# Patient Record
Sex: MALE | Race: WHITE | NOT HISPANIC OR LATINO | Employment: OTHER | URBAN - METROPOLITAN AREA
[De-identification: names, ages, dates, MRNs, and addresses within clinical notes are randomized per-mention and may not be internally consistent; named-entity substitution may affect disease eponyms.]

---

## 2017-01-19 RX ORDER — PREDNISONE 20 MG/1
TABLET ORAL
Qty: 14 TABLET | Refills: 0 | Status: SHIPPED | OUTPATIENT
Start: 2017-01-19 | End: 2017-01-29 | Stop reason: SDUPTHER

## 2017-01-29 RX ORDER — PREDNISONE 20 MG/1
TABLET ORAL
Qty: 14 TABLET | Refills: 0 | Status: SHIPPED | OUTPATIENT
Start: 2017-01-29 | End: 2017-03-09 | Stop reason: SDUPTHER

## 2017-02-16 RX ORDER — PREDNISONE 20 MG/1
TABLET ORAL
Qty: 14 TABLET | Refills: 0 | OUTPATIENT
Start: 2017-02-16

## 2017-03-09 ENCOUNTER — OFFICE VISIT (OUTPATIENT)
Dept: FAMILY MEDICINE CLINIC | Facility: CLINIC | Age: 67
End: 2017-03-09

## 2017-03-09 VITALS
SYSTOLIC BLOOD PRESSURE: 156 MMHG | RESPIRATION RATE: 16 BRPM | WEIGHT: 301 LBS | HEIGHT: 68 IN | HEART RATE: 131 BPM | TEMPERATURE: 98.6 F | BODY MASS INDEX: 45.62 KG/M2 | DIASTOLIC BLOOD PRESSURE: 77 MMHG

## 2017-03-09 DIAGNOSIS — M79.605 PAIN OF LEFT LOWER EXTREMITY: ICD-10-CM

## 2017-03-09 DIAGNOSIS — Z12.5 SCREENING FOR PROSTATE CANCER: ICD-10-CM

## 2017-03-09 DIAGNOSIS — I10 BENIGN ESSENTIAL HYPERTENSION: Primary | ICD-10-CM

## 2017-03-09 DIAGNOSIS — R00.0 TACHYCARDIA: ICD-10-CM

## 2017-03-09 PROCEDURE — 99214 OFFICE O/P EST MOD 30 MIN: CPT | Performed by: FAMILY MEDICINE

## 2017-03-09 RX ORDER — PREDNISONE 20 MG/1
20 TABLET ORAL 2 TIMES DAILY
Qty: 14 TABLET | Refills: 0 | Status: SHIPPED | OUTPATIENT
Start: 2017-03-09 | End: 2018-06-14

## 2017-03-09 NOTE — PROGRESS NOTES
"Subjective   Ganesh Viera is a 66 y.o. male.     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 He has a h/o recurrent leg pain over the years and reports he usually gets some Prednisone that takes care of the issue well.      The following portions of the patient's history were reviewed and updated as appropriate: allergies, current medications, past family history, past medical history, past social history, past surgical history and problem list.    Review of Systems   Constitutional: Negative for activity change, chills, fatigue and fever.   Respiratory: Negative for cough and chest tightness.    Cardiovascular: Negative for chest pain and palpitations.   Gastrointestinal: Negative for abdominal pain and nausea.   Endocrine: Negative for cold intolerance and polydipsia.   Musculoskeletal:        Leg pain   Psychiatric/Behavioral: Negative for behavioral problems and dysphoric mood.   All other systems reviewed and are negative.    Visit Vitals   • /77 (BP Location: Left arm, Patient Position: Sitting, Cuff Size: Large Adult)   • Pulse (!) 131   • Temp 98.6 °F (37 °C) (Oral)   • Resp 16   • Ht 68\" (172.7 cm)   • Wt (!) 301 lb (137 kg)   • BMI 45.77 kg/m2       Objective   Physical Exam   Constitutional: He appears well-developed and well-nourished.   Neck: Neck supple. No thyromegaly present.   Cardiovascular: Regular rhythm.  Tachycardia present.    No murmur heard.  Pulmonary/Chest: Effort normal and breath sounds normal.   Abdominal: Bowel sounds are normal.   Psychiatric: He has a normal mood and affect. His behavior is normal.   Nursing note and vitals reviewed.      Assessment/Plan   Ganesh was seen today for leg pain.    Diagnoses and all orders for this visit:    Benign essential hypertension  -     Comprehensive metabolic panel  -     CBC and Differential  -     TSH    Pain of left lower extremity  -     predniSONE (DELTASONE) 20 MG tablet; Take 1 tablet " by mouth 2 (Two) Times a Day.    Tachycardia  -     T4, Free    Screening for prostate cancer  -     PSA    Other orders  -     Cancel: Lipid panel

## 2017-03-12 DIAGNOSIS — D72.829 LEUKOCYTOSIS, UNSPECIFIED TYPE: Primary | ICD-10-CM

## 2017-03-12 LAB
ALBUMIN SERPL-MCNC: 4.4 G/DL (ref 3.6–4.8)
ALBUMIN/GLOB SERPL: 1.6 {RATIO} (ref 1.1–2.5)
ALP SERPL-CCNC: 79 IU/L (ref 39–117)
ALT SERPL-CCNC: 15 IU/L (ref 0–44)
AST SERPL-CCNC: 19 IU/L (ref 0–40)
BASOPHILS # BLD AUTO: 0 X10E3/UL (ref 0–0.2)
BASOPHILS NFR BLD AUTO: 0 %
BILIRUB SERPL-MCNC: 0.4 MG/DL (ref 0–1.2)
BUN SERPL-MCNC: 27 MG/DL (ref 8–27)
BUN/CREAT SERPL: 25 (ref 10–22)
CALCIUM SERPL-MCNC: 9.1 MG/DL (ref 8.6–10.2)
CHLORIDE SERPL-SCNC: 97 MMOL/L (ref 96–106)
CO2 SERPL-SCNC: 24 MMOL/L (ref 18–29)
CREAT SERPL-MCNC: 1.06 MG/DL (ref 0.76–1.27)
EOSINOPHIL # BLD AUTO: 0.1 X10E3/UL (ref 0–0.4)
EOSINOPHIL NFR BLD AUTO: 0 %
ERYTHROCYTE [DISTWIDTH] IN BLOOD BY AUTOMATED COUNT: 15.5 % (ref 12.3–15.4)
GLOBULIN SER CALC-MCNC: 2.8 G/DL (ref 1.5–4.5)
GLUCOSE SERPL-MCNC: 99 MG/DL (ref 65–99)
HCT VFR BLD AUTO: 44.8 % (ref 37.5–51)
HGB BLD-MCNC: 14.4 G/DL (ref 12.6–17.7)
IMM GRANULOCYTES # BLD: 0 X10E3/UL (ref 0–0.1)
IMM GRANULOCYTES NFR BLD: 0 %
LYMPHOCYTES # BLD AUTO: 3.1 X10E3/UL (ref 0.7–3.1)
LYMPHOCYTES NFR BLD AUTO: 15 %
MCH RBC QN AUTO: 28.3 PG (ref 26.6–33)
MCHC RBC AUTO-ENTMCNC: 32.1 G/DL (ref 31.5–35.7)
MCV RBC AUTO: 88 FL (ref 79–97)
MONOCYTES # BLD AUTO: 1.6 X10E3/UL (ref 0.1–0.9)
MONOCYTES NFR BLD AUTO: 8 %
MORPHOLOGY BLD-IMP: (no result)
NEUTROPHILS # BLD AUTO: 16.3 X10E3/UL (ref 1.4–7)
NEUTROPHILS NFR BLD AUTO: 77 %
PLATELET # BLD AUTO: 512 X10E3/UL (ref 150–379)
POTASSIUM SERPL-SCNC: 4 MMOL/L (ref 3.5–5.2)
PROT SERPL-MCNC: 7.2 G/DL (ref 6–8.5)
PSA SERPL-MCNC: 3.8 NG/ML (ref 0–4)
RBC # BLD AUTO: 5.08 X10E6/UL (ref 4.14–5.8)
SODIUM SERPL-SCNC: 140 MMOL/L (ref 134–144)
T4 FREE SERPL-MCNC: 1.47 NG/DL (ref 0.82–1.77)
TSH SERPL DL<=0.005 MIU/L-ACNC: 2.18 UIU/ML (ref 0.45–4.5)
WBC # BLD AUTO: 21.2 X10E3/UL (ref 3.4–10.8)

## 2017-03-23 ENCOUNTER — APPOINTMENT (OUTPATIENT)
Dept: LAB | Facility: HOSPITAL | Age: 67
End: 2017-03-23

## 2017-03-23 ENCOUNTER — APPOINTMENT (OUTPATIENT)
Dept: ONCOLOGY | Facility: CLINIC | Age: 67
End: 2017-03-23

## 2017-03-23 DIAGNOSIS — M79.605 PAIN OF LEFT LOWER EXTREMITY: ICD-10-CM

## 2017-03-23 RX ORDER — PREDNISONE 20 MG/1
TABLET ORAL
Qty: 14 TABLET | Refills: 0 | OUTPATIENT
Start: 2017-03-23

## 2017-04-10 ENCOUNTER — LAB (OUTPATIENT)
Dept: LAB | Facility: HOSPITAL | Age: 67
End: 2017-04-10

## 2017-04-10 ENCOUNTER — CONSULT (OUTPATIENT)
Dept: ONCOLOGY | Facility: CLINIC | Age: 67
End: 2017-04-10

## 2017-04-10 ENCOUNTER — APPOINTMENT (OUTPATIENT)
Dept: LAB | Facility: HOSPITAL | Age: 67
End: 2017-04-10

## 2017-04-10 VITALS
OXYGEN SATURATION: 95 % | HEIGHT: 66 IN | HEART RATE: 110 BPM | BODY MASS INDEX: 46.93 KG/M2 | TEMPERATURE: 97.7 F | RESPIRATION RATE: 18 BRPM | SYSTOLIC BLOOD PRESSURE: 158 MMHG | DIASTOLIC BLOOD PRESSURE: 100 MMHG | WEIGHT: 292 LBS

## 2017-04-10 DIAGNOSIS — D72.828 OTHER ELEVATED WHITE BLOOD CELL (WBC) COUNT: Primary | ICD-10-CM

## 2017-04-10 DIAGNOSIS — M15.3 POST-TRAUMATIC OSTEOARTHRITIS OF MULTIPLE JOINTS: Primary | ICD-10-CM

## 2017-04-10 PROBLEM — D72.829 LEUKOCYTOSIS: Status: ACTIVE | Noted: 2017-04-10

## 2017-04-10 LAB
ALBUMIN SERPL-MCNC: 4.1 G/DL (ref 3.5–5.2)
ALBUMIN/GLOB SERPL: 1.1 G/DL (ref 1.1–2.4)
ALP SERPL-CCNC: 85 U/L (ref 38–116)
ALT SERPL W P-5'-P-CCNC: 15 U/L (ref 0–41)
ANION GAP SERPL CALCULATED.3IONS-SCNC: 17.5 MMOL/L
AST SERPL-CCNC: 18 U/L (ref 0–40)
BASOPHILS # BLD AUTO: 0.06 10*3/MM3 (ref 0–0.1)
BASOPHILS NFR BLD AUTO: 0.4 % (ref 0–1.1)
BILIRUB SERPL-MCNC: 0.3 MG/DL (ref 0.1–1.2)
BUN BLD-MCNC: 15 MG/DL (ref 6–20)
BUN/CREAT SERPL: 12.2 (ref 7.3–30)
CALCIUM SPEC-SCNC: 9.7 MG/DL (ref 8.5–10.2)
CHLORIDE SERPL-SCNC: 95 MMOL/L (ref 98–107)
CO2 SERPL-SCNC: 27.5 MMOL/L (ref 22–29)
CREAT BLD-MCNC: 1.23 MG/DL (ref 0.7–1.3)
DEPRECATED RDW RBC AUTO: 49 FL (ref 37–49)
EOSINOPHIL # BLD AUTO: 0.1 10*3/MM3 (ref 0–0.36)
EOSINOPHIL NFR BLD AUTO: 0.6 % (ref 1–5)
ERYTHROCYTE [DISTWIDTH] IN BLOOD BY AUTOMATED COUNT: 15.3 % (ref 11.7–14.5)
ERYTHROCYTE [SEDIMENTATION RATE] IN BLOOD: 64 MM/HR (ref 0–20)
FERRITIN SERPL-MCNC: 400.2 NG/ML (ref 30–400)
GFR SERPL CREATININE-BSD FRML MDRD: 59 ML/MIN/1.73
GLOBULIN UR ELPH-MCNC: 3.9 GM/DL (ref 1.8–3.5)
GLUCOSE BLD-MCNC: 144 MG/DL (ref 74–124)
HCT VFR BLD AUTO: 42.4 % (ref 40–49)
HGB BLD-MCNC: 13.7 G/DL (ref 13.5–16.5)
HOLD SPECIMEN: NORMAL
IMM GRANULOCYTES # BLD: 0.1 10*3/MM3 (ref 0–0.03)
IMM GRANULOCYTES NFR BLD: 0.6 % (ref 0–0.5)
IRON 24H UR-MRATE: 45 MCG/DL (ref 59–158)
IRON SATN MFR SERPL: 17 % (ref 14–48)
LDH SERPL-CCNC: 210 U/L (ref 99–259)
LYMPHOCYTES # BLD AUTO: 1.4 10*3/MM3 (ref 1–3.5)
LYMPHOCYTES NFR BLD AUTO: 9 % (ref 20–49)
MCH RBC QN AUTO: 28.4 PG (ref 27–33)
MCHC RBC AUTO-ENTMCNC: 32.3 G/DL (ref 32–35)
MCV RBC AUTO: 88 FL (ref 83–97)
MONOCYTES # BLD AUTO: 1.5 10*3/MM3 (ref 0.25–0.8)
MONOCYTES NFR BLD AUTO: 9.6 % (ref 4–12)
NEUTROPHILS # BLD AUTO: 12.39 10*3/MM3 (ref 1.5–7)
NEUTROPHILS NFR BLD AUTO: 79.8 % (ref 39–75)
NRBC BLD MANUAL-RTO: 0 /100 WBC (ref 0–0)
PLATELET # BLD AUTO: 428 10*3/MM3 (ref 150–375)
PMV BLD AUTO: 10.1 FL (ref 8.9–12.1)
POTASSIUM BLD-SCNC: 3.7 MMOL/L (ref 3.5–4.7)
PROT SERPL-MCNC: 8 G/DL (ref 6.3–8)
RBC # BLD AUTO: 4.82 10*6/MM3 (ref 4.3–5.5)
SODIUM BLD-SCNC: 140 MMOL/L (ref 134–145)
TIBC SERPL-MCNC: 262 MCG/DL (ref 249–505)
TRANSFERRIN SERPL-MCNC: 187 MG/DL (ref 200–360)
WBC NRBC COR # BLD: 15.55 10*3/MM3 (ref 4–10)
WHOLE BLOOD HOLD SPECIMEN: NORMAL

## 2017-04-10 PROCEDURE — 83540 ASSAY OF IRON: CPT | Performed by: INTERNAL MEDICINE

## 2017-04-10 PROCEDURE — 83615 LACTATE (LD) (LDH) ENZYME: CPT | Performed by: INTERNAL MEDICINE

## 2017-04-10 PROCEDURE — 99244 OFF/OP CNSLTJ NEW/EST MOD 40: CPT | Performed by: INTERNAL MEDICINE

## 2017-04-10 PROCEDURE — 80053 COMPREHEN METABOLIC PANEL: CPT | Performed by: INTERNAL MEDICINE

## 2017-04-10 PROCEDURE — 36415 COLL VENOUS BLD VENIPUNCTURE: CPT | Performed by: INTERNAL MEDICINE

## 2017-04-10 PROCEDURE — 85025 COMPLETE CBC W/AUTO DIFF WBC: CPT | Performed by: INTERNAL MEDICINE

## 2017-04-10 PROCEDURE — 84466 ASSAY OF TRANSFERRIN: CPT | Performed by: INTERNAL MEDICINE

## 2017-04-10 PROCEDURE — 85652 RBC SED RATE AUTOMATED: CPT | Performed by: INTERNAL MEDICINE

## 2017-04-10 PROCEDURE — 82728 ASSAY OF FERRITIN: CPT | Performed by: INTERNAL MEDICINE

## 2017-04-10 NOTE — PROGRESS NOTES
Subjective     REASON FOR CONSULTATION:  Leukocytosis  Provide an opinion on any further workup or treatment                             REQUESTING PHYSICIAN:  Dr. Ganesh Gomez    RECORDS OBTAINED:  Records of the patients history including those obtained from the referring provider were reviewed and summarized in detail.    History of Present Illness   Mr. Viera is a very pleasant 66-year-old man seen today at the request of his primary care physician for leukocytosis.  The patient has history of psoriasis and tendinitis.  He has joint complaints primarily affecting the knees and wonders if he potentially has psoriatic arthritis.  The patient states he was a previous patient of Dr. Frias and was told several years ago that his white blood cell count was elevated but unsure the number.  He recently saw Dr. Gomez with complaints of tendinitis and was placed on prednisone.  He states he had a CBC performed 2 or 3 days after starting prednisone which showed a elevated total white blood cell count of 21,000 and elevated platelets of 512.  The differential was pertinent for neutrophilia 16.3 and mild elevation of the monocytes 1.6.  Hemoglobin was normal at 14.4.  He finished his prednisone course approximately 2 weeks ago.  He still complains of pain affecting the right knee.    Otherwise the patient generally feels well.  He has lost approximately 16 pounds over the past one year after some dietary modification after half-way.  He has no significant smoking history.  He denies cough or shortness of breath.  He denies pain other than the joint pain described.  He denies fevers or chills.  He denies night sweats.  His appetite is good.    Past Medical History:   Diagnosis Date   • Benign essential hypertension    • CHF (congestive heart failure)    • Eye exam, routine 2011   • H/O Leukocytosis    • Osteoarthritis    • Peripheral edema    • Psoriasis         No past surgical history on file.     Current  Outpatient Prescriptions on File Prior to Visit   Medication Sig Dispense Refill   • aspirin 81 MG EC tablet Take 81 mg by mouth daily.     • calcipotriene (DOVONEX) 0.005 % cream apply to affected area twice a day 120 g 5   • clobetasol (TEMOVATE) 0.05 % cream apply to affected area once daily 60 g 5   • fluticasone (CUTIVATE) 0.05 % cream apply THIN FILM to affected area once daily 60 g 5   • furosemide (LASIX) 40 MG tablet take 1 tablet by mouth once daily 30 tablet 5   • lisinopril-hydrochlorothiazide (PRINZIDE,ZESTORETIC) 20-12.5 MG per tablet take 2 tablets by mouth once daily 60 tablet 5   • meloxicam (MOBIC) 15 MG tablet take 1 tablet by mouth once daily 30 tablet 5   • Multiple Vitamins-Minerals (MULTIVITAMIN ADULT PO) Take  by mouth.     • nisoldipine (SULAR) 34 MG 24 hr tablet take 1 tablet by mouth once daily 30 tablet 5   • predniSONE (DELTASONE) 20 MG tablet Take 1 tablet by mouth 2 (Two) Times a Day. 14 tablet 0   • Thiamine HCl (VITAMIN B-1) 50 MG tablet Take 50 mg by mouth daily.       No current facility-administered medications on file prior to visit.         ALLERGIES:    Allergies   Allergen Reactions   • Pollen Extract         Social History     Social History   • Marital status:      Spouse name: N/A   • Number of children: N/A   • Years of education: N/A     Social History Main Topics   • Smoking status: Former Smoker   • Smokeless tobacco: Never Used   • Alcohol use Yes      Comment: occ   • Drug use: No   • Sexual activity: Not Asked     Other Topics Concern   • None     Social History Narrative        Family History   Problem Relation Age of Onset   • Heart disease Mother    • Hypertension Mother    • Diabetes Father    • Hypertension Father    • Kidney disease Father         Review of Systems   Constitutional: Negative for activity change, appetite change, fatigue, fever and unexpected weight change.   HENT: Negative.    Eyes: Negative.    Respiratory: Negative.    Cardiovascular:  "Negative.    Gastrointestinal: Negative.    Genitourinary: Negative.    Musculoskeletal: Positive for arthralgias and joint swelling.   Skin: Positive for rash.   Neurological: Negative.    Hematological: Negative.    Psychiatric/Behavioral: Negative.          Objective     Vitals:    04/10/17 0828   BP: 158/100   Pulse: 110   Resp: 18   Temp: 97.7 °F (36.5 °C)   SpO2: 95%   Weight: 292 lb (132 kg)   Height: 66.14\" (168 cm)  Comment: new pt/ht   PainSc: 3  Comment: pain in R knee     Current Status 4/10/2017   ECOG score 0       Physical Exam    General: Pleasant well-developed obese gentleman in no acute distress  HEENT: No scleral icterus noted  Chest: No wheezing, rales, rhonchi  Cardiovascular: Regular rate rhythm, normal S1-S2  Abdomen: Obese, soft, no masses noted; I cannot assess splenomegaly secondary to his habitus  Musculoskeletal: There are venous stasis changes of the lower extremities with trace to 1+ edema of the ankles   Skin: Multiple psoriatic plaques noted    RECENT LABS:  Hematology WBC   Date Value Ref Range Status   04/10/2017 15.55 (H) 4.00 - 10.00 10*3/mm3 Final     RBC   Date Value Ref Range Status   04/10/2017 4.82 4.30 - 5.50 10*6/mm3 Final     Hemoglobin   Date Value Ref Range Status   04/10/2017 13.7 13.5 - 16.5 g/dL Final     Hematocrit   Date Value Ref Range Status   04/10/2017 42.4 40.0 - 49.0 % Final     Platelets   Date Value Ref Range Status   04/10/2017 428 (H) 150 - 375 10*3/mm3 Final        Lab Results   Component Value Date    BUN 27 03/11/2017    CREATININE 1.06 03/11/2017    EGFRIFNONA 74 03/11/2017    EGFRIFAFRI 85 03/11/2017    BCR 25 (H) 03/11/2017    K 4.0 03/11/2017    CO2 24 03/11/2017    CALCIUM 9.1 03/11/2017    PROTENTOTREF 7.2 03/11/2017    ALBUMIN 4.4 03/11/2017    LABIL2 1.6 03/11/2017    AST 19 03/11/2017    ALT 15 03/11/2017         Assessment/Plan     This is a very pleasant 66-year-old man with leukocytosis and thrombocytosis.  His CBC is somewhat difficult to " interpret as he has been on prednisone recently for treatment of tendinitis, but he states he has been off prednisone for approximately 2 weeks.  His differential is pertinent for neutrophilia and monocytosis.  He has history of psoriasis which is active.  Unclear at this time if his elevated counts could be potentially inflammatory secondary to psoriasis.  The white count was lower today than one month ago as he was on prednisone at the previous check.    To further evaluate the patient's leukocytosis and thrombocytosis, I will proceed with a peripheral blood flow cytometry,   J AK analysis, FISH for BCR-ABL, serum protein electrophoresis with immunofixation, LDH, iron profile, sedimentation rate.  I will see the patient back in 2-3 weeks to review results and repeat the CBC.    He has never had a colonoscopy and has familial history of colon cancer in several cousins.  I referred him to GI for screening colonoscopy.

## 2017-04-11 LAB
ALBUMIN SERPL-MCNC: 3.4 G/DL (ref 2.9–4.4)
ALBUMIN/GLOB SERPL: 0.9 {RATIO} (ref 0.7–1.7)
ALPHA1 GLOB FLD ELPH-MCNC: 0.4 G/DL (ref 0–0.4)
ALPHA2 GLOB SERPL ELPH-MCNC: 1.1 G/DL (ref 0.4–1)
B-GLOBULIN SERPL ELPH-MCNC: 1.3 G/DL (ref 0.7–1.3)
GAMMA GLOB SERPL ELPH-MCNC: 0.9 G/DL (ref 0.4–1.8)
GLOBULIN SER CALC-MCNC: 3.7 G/DL (ref 2.2–3.9)
IGA SERPL-MCNC: 318 MG/DL (ref 61–437)
IGG SERPL-MCNC: 872 MG/DL (ref 700–1600)
IGM SERPL-MCNC: 77 MG/DL (ref 20–172)
Lab: ABNORMAL
M-SPIKE: ABNORMAL G/DL
PROT PATTERN SERPL IFE-IMP: NORMAL
PROT SERPL-MCNC: 7.1 G/DL (ref 6–8.5)

## 2017-04-18 LAB
REF LAB TEST METHOD: NORMAL

## 2017-04-24 ENCOUNTER — OFFICE VISIT (OUTPATIENT)
Dept: ONCOLOGY | Facility: CLINIC | Age: 67
End: 2017-04-24

## 2017-04-24 ENCOUNTER — LAB (OUTPATIENT)
Dept: LAB | Facility: HOSPITAL | Age: 67
End: 2017-04-24

## 2017-04-24 VITALS
WEIGHT: 293.7 LBS | RESPIRATION RATE: 18 BRPM | SYSTOLIC BLOOD PRESSURE: 162 MMHG | TEMPERATURE: 97.8 F | HEART RATE: 110 BPM | DIASTOLIC BLOOD PRESSURE: 92 MMHG | HEIGHT: 66 IN | BODY MASS INDEX: 47.2 KG/M2 | OXYGEN SATURATION: 96 %

## 2017-04-24 DIAGNOSIS — D72.828 OTHER ELEVATED WHITE BLOOD CELL (WBC) COUNT: Primary | ICD-10-CM

## 2017-04-24 DIAGNOSIS — D72.828 OTHER ELEVATED WHITE BLOOD CELL (WBC) COUNT: ICD-10-CM

## 2017-04-24 LAB
BASOPHILS # BLD AUTO: 0.05 10*3/MM3 (ref 0–0.1)
BASOPHILS NFR BLD AUTO: 0.4 % (ref 0–1.1)
DEPRECATED RDW RBC AUTO: 47.6 FL (ref 37–49)
EOSINOPHIL # BLD AUTO: 0.18 10*3/MM3 (ref 0–0.36)
EOSINOPHIL NFR BLD AUTO: 1.4 % (ref 1–5)
ERYTHROCYTE [DISTWIDTH] IN BLOOD BY AUTOMATED COUNT: 15.1 % (ref 11.7–14.5)
HCT VFR BLD AUTO: 42.2 % (ref 40–49)
HGB BLD-MCNC: 14 G/DL (ref 13.5–16.5)
IMM GRANULOCYTES # BLD: 0.15 10*3/MM3 (ref 0–0.03)
IMM GRANULOCYTES NFR BLD: 1.1 % (ref 0–0.5)
LYMPHOCYTES # BLD AUTO: 1.67 10*3/MM3 (ref 1–3.5)
LYMPHOCYTES NFR BLD AUTO: 12.6 % (ref 20–49)
MCH RBC QN AUTO: 28.6 PG (ref 27–33)
MCHC RBC AUTO-ENTMCNC: 33.2 G/DL (ref 32–35)
MCV RBC AUTO: 86.3 FL (ref 83–97)
MONOCYTES # BLD AUTO: 1.51 10*3/MM3 (ref 0.25–0.8)
MONOCYTES NFR BLD AUTO: 11.4 % (ref 4–12)
NEUTROPHILS # BLD AUTO: 9.68 10*3/MM3 (ref 1.5–7)
NEUTROPHILS NFR BLD AUTO: 73.1 % (ref 39–75)
NRBC BLD MANUAL-RTO: 0 /100 WBC (ref 0–0)
PLATELET # BLD AUTO: 329 10*3/MM3 (ref 150–375)
PMV BLD AUTO: 10.6 FL (ref 8.9–12.1)
RBC # BLD AUTO: 4.89 10*6/MM3 (ref 4.3–5.5)
WBC NRBC COR # BLD: 13.24 10*3/MM3 (ref 4–10)

## 2017-04-24 PROCEDURE — 36416 COLLJ CAPILLARY BLOOD SPEC: CPT

## 2017-04-24 PROCEDURE — 85025 COMPLETE CBC W/AUTO DIFF WBC: CPT

## 2017-04-24 PROCEDURE — 99213 OFFICE O/P EST LOW 20 MIN: CPT | Performed by: INTERNAL MEDICINE

## 2017-04-24 NOTE — PROGRESS NOTES
Subjective     REASON FOR CONSULTATION:  Leukocytosis  Provide an opinion on any further workup or treatment                             REQUESTING PHYSICIAN:  Dr. Ganesh Gomez    RECORDS OBTAINED:  Records of the patients history including those obtained from the referring provider were reviewed and summarized in detail.    History of Present Illness   Mr. Viera is a very pleasant 66-year-old man seen today at the request of his primary care physician for leukocytosis.  The patient has history of psoriasis and tendinitis.  He has joint complaints primarily affecting the knees and wonders if he potentially has psoriatic arthritis.  The patient states he was a previous patient of Dr. Frias and was told several years ago that his white blood cell count was elevated but unsure the number.  He recently saw Dr. Gomez with complaints of tendinitis and was placed on prednisone.  He states he had a CBC performed 2 or 3 days after starting prednisone which showed a elevated total white blood cell count of 21,000 and elevated platelets of 512.  The differential was pertinent for neutrophilia 16.3 and mild elevation of the monocytes 1.6.  Hemoglobin was normal at 14.4.  He finished his prednisone course approximately 2 weeks ago.  He still complains of pain affecting the right knee.    Otherwise the patient generally feels well.  He has lost approximately 16 pounds over the past one year after some dietary modification after [facility].  He has no significant smoking history.  He denies cough or shortness of breath.  He denies pain other than the joint pain described.  He denies fevers or chills.  He denies night sweats.  His appetite is good.    Past Medical History:   Diagnosis Date   • Benign essential hypertension    • CHF (congestive heart failure)    • Eye exam, routine 2011   • H/O Leukocytosis    • Osteoarthritis    • Peripheral edema    • Psoriasis         No past surgical history on file.     Current  Outpatient Prescriptions on File Prior to Visit   Medication Sig Dispense Refill   • Acetaminophen (TYLENOL ARTHRITIS PAIN PO) Take  by mouth 2 (Two) Times a Day.     • aspirin 81 MG EC tablet Take 81 mg by mouth daily.     • calcipotriene (DOVONEX) 0.005 % cream apply to affected area twice a day 120 g 5   • clobetasol (TEMOVATE) 0.05 % cream apply to affected area once daily 60 g 5   • fluticasone (CUTIVATE) 0.05 % cream apply THIN FILM to affected area once daily 60 g 5   • furosemide (LASIX) 40 MG tablet take 1 tablet by mouth once daily 30 tablet 5   • lisinopril-hydrochlorothiazide (PRINZIDE,ZESTORETIC) 20-12.5 MG per tablet take 2 tablets by mouth once daily 60 tablet 5   • meloxicam (MOBIC) 15 MG tablet take 1 tablet by mouth once daily 30 tablet 5   • Multiple Vitamins-Minerals (MULTIVITAMIN ADULT PO) Take  by mouth.     • nisoldipine (SULAR) 34 MG 24 hr tablet take 1 tablet by mouth once daily 30 tablet 5   • predniSONE (DELTASONE) 20 MG tablet Take 1 tablet by mouth 2 (Two) Times a Day. 14 tablet 0   • Thiamine HCl (VITAMIN B-1) 50 MG tablet Take 50 mg by mouth daily.       No current facility-administered medications on file prior to visit.         ALLERGIES:    Allergies   Allergen Reactions   • Pollen Extract         Social History     Social History   • Marital status:      Spouse name: N/A   • Number of children: N/A   • Years of education: High school     Occupational History   • [employer] Retired     Social History Main Topics   • Smoking status: Former Smoker     Packs/day: 1.00     Years: 10.00     Types: Cigarettes   • Smokeless tobacco: Never Used   • Alcohol use Yes      Comment: occ   • Drug use: No   • Sexual activity: Not Asked     Other Topics Concern   • None     Social History Narrative        Family History   Problem Relation Age of Onset   • Heart disease Mother    • Hypertension Mother    • Heart attack Mother    • Diabetes Father    • Hypertension Father    • Kidney disease Father   "  • Alzheimer's disease Father         Review of Systems   Constitutional: Negative for activity change, appetite change, fatigue, fever and unexpected weight change.   HENT: Negative.    Eyes: Negative.    Respiratory: Negative.    Cardiovascular: Negative.    Gastrointestinal: Negative.    Genitourinary: Negative.    Musculoskeletal: Positive for arthralgias and joint swelling.   Skin: Positive for rash.   Neurological: Negative.    Hematological: Negative.    Psychiatric/Behavioral: Negative.          Objective     Vitals:    04/24/17 1410   BP: 162/92   Pulse: 110   Resp: 18   Temp: 97.8 °F (36.6 °C)   SpO2: 96%   Weight: 293 lb 11.2 oz (133 kg)   Height: 66.14\" (168 cm)   PainSc: 0-No pain     Current Status 4/24/2017   ECOG score 0       Physical Exam    General: Pleasant well-developed obese gentleman in no acute distress  HEENT: No scleral icterus noted  Chest: No wheezing, rales, rhonchi  Cardiovascular: Regular rate rhythm, normal S1-S2  Abdomen: Obese, soft, no masses noted; I cannot assess splenomegaly secondary to his habitus  Musculoskeletal: There are venous stasis changes of the lower extremities with trace to 1+ edema of the ankles   Skin: Multiple psoriatic plaques noted    RECENT LABS:  Hematology WBC   Date Value Ref Range Status   04/24/2017 13.24 (H) 4.00 - 10.00 10*3/mm3 Final     RBC   Date Value Ref Range Status   04/24/2017 4.89 4.30 - 5.50 10*6/mm3 Final     Hemoglobin   Date Value Ref Range Status   04/24/2017 14.0 13.5 - 16.5 g/dL Final     Hematocrit   Date Value Ref Range Status   04/24/2017 42.2 40.0 - 49.0 % Final     Platelets   Date Value Ref Range Status   04/24/2017 329 150 - 375 10*3/mm3 Final        Lab Results   Component Value Date    GLUCOSE 144 (H) 04/10/2017    BUN 15 04/10/2017    CREATININE 1.23 04/10/2017    EGFRIFNONA 59 (L) 04/10/2017    EGFRIFAFRI 85 03/11/2017    BCR 12.2 04/10/2017    K 3.7 04/10/2017    CO2 27.5 04/10/2017    CALCIUM 9.7 04/10/2017    PROTENTOTREF " 7.1 04/10/2017    ALBUMIN 3.4 04/10/2017    ALBUMIN 4.10 04/10/2017    LABIL2 0.9 04/10/2017    LABIL2 1.1 04/10/2017    AST 18 04/10/2017    ALT 15 04/10/2017         Assessment/Plan     This is a very pleasant 66-year-old man with leukocytosis and thrombocytosis.      His laboratory evaluation review today included a peripheral blood flow cytometry which showed no abnormal lymphoid populations and no significant leftward shift of myeloid cells.  Neutrophilia was noted.  Peripheral blood FISH for BCR-ABL was negative.  J AK [id number] and exon 12 mutations were negative.  The ferritin was mildly elevated at 400 with a normal iron saturation of 17%.  Protein electrophoresis showed no M spike.  LDH normal at 210.  The sedimentation rate was elevated at 64.    Given this evaluation, the patient's leukocytosis and previous thrombocytosis (platelets now normal) appears to be reactive and or inflammatory given the elevated sedimentation rate and ferritin which is also an inflammatory molecule.  My suspicion for a hematologic disorder is low.  I will defer back to his PCP for further workup of chronic inflammatory states.  I did recommend the patient have a colonoscopy for cancer screening in addition to a PSA and prostate exam with his PCP.  He has a remote smoking history so one could consider imaging of the chest although he is currently having no shortness of breath, cough or other pulmonary symptoms.

## 2017-05-10 DIAGNOSIS — M15.3 POST-TRAUMATIC OSTEOARTHRITIS OF MULTIPLE JOINTS: ICD-10-CM

## 2017-05-10 DIAGNOSIS — I10 BENIGN ESSENTIAL HYPERTENSION: ICD-10-CM

## 2017-05-10 DIAGNOSIS — R60.9 PERIPHERAL EDEMA: ICD-10-CM

## 2017-05-11 ENCOUNTER — OFFICE VISIT (OUTPATIENT)
Dept: FAMILY MEDICINE CLINIC | Facility: CLINIC | Age: 67
End: 2017-05-11

## 2017-05-11 VITALS
HEIGHT: 68 IN | RESPIRATION RATE: 20 BRPM | HEART RATE: 106 BPM | TEMPERATURE: 98.3 F | DIASTOLIC BLOOD PRESSURE: 98 MMHG | WEIGHT: 290 LBS | BODY MASS INDEX: 43.95 KG/M2 | SYSTOLIC BLOOD PRESSURE: 149 MMHG

## 2017-05-11 DIAGNOSIS — I10 BENIGN ESSENTIAL HYPERTENSION: ICD-10-CM

## 2017-05-11 DIAGNOSIS — G89.29 CHRONIC PAIN OF RIGHT KNEE: Primary | ICD-10-CM

## 2017-05-11 DIAGNOSIS — M25.561 CHRONIC PAIN OF RIGHT KNEE: Primary | ICD-10-CM

## 2017-05-11 DIAGNOSIS — R60.9 PERIPHERAL EDEMA: ICD-10-CM

## 2017-05-11 DIAGNOSIS — L40.9 PSORIASIS: ICD-10-CM

## 2017-05-11 DIAGNOSIS — M15.3 POST-TRAUMATIC OSTEOARTHRITIS OF MULTIPLE JOINTS: ICD-10-CM

## 2017-05-11 PROCEDURE — 99214 OFFICE O/P EST MOD 30 MIN: CPT | Performed by: FAMILY MEDICINE

## 2017-05-11 RX ORDER — FUROSEMIDE 40 MG/1
TABLET ORAL
Qty: 30 TABLET | Refills: 5 | OUTPATIENT
Start: 2017-05-11

## 2017-05-11 RX ORDER — CALCIPOTRIENE 50 UG/G
CREAM TOPICAL 2 TIMES DAILY
Qty: 120 G | Refills: 5 | Status: SHIPPED | OUTPATIENT
Start: 2017-05-11 | End: 2017-05-12 | Stop reason: SDUPTHER

## 2017-05-11 RX ORDER — MELOXICAM 15 MG/1
15 TABLET ORAL DAILY
Qty: 30 TABLET | Refills: 5 | Status: SHIPPED | OUTPATIENT
Start: 2017-05-11 | End: 2017-11-20 | Stop reason: SDUPTHER

## 2017-05-11 RX ORDER — NISOLDIPINE 34 MG/1
TABLET, FILM COATED, EXTENDED RELEASE ORAL
Qty: 30 TABLET | Refills: 5 | OUTPATIENT
Start: 2017-05-11

## 2017-05-11 RX ORDER — FUROSEMIDE 40 MG/1
40 TABLET ORAL DAILY
Qty: 30 TABLET | Refills: 5 | Status: SHIPPED | OUTPATIENT
Start: 2017-05-11 | End: 2017-11-14 | Stop reason: SDUPTHER

## 2017-05-11 RX ORDER — LISINOPRIL AND HYDROCHLOROTHIAZIDE 20; 12.5 MG/1; MG/1
2 TABLET ORAL DAILY
Qty: 60 TABLET | Refills: 5 | Status: SHIPPED | OUTPATIENT
Start: 2017-05-11 | End: 2017-11-14 | Stop reason: SDUPTHER

## 2017-05-11 RX ORDER — LISINOPRIL AND HYDROCHLOROTHIAZIDE 20; 12.5 MG/1; MG/1
TABLET ORAL
Qty: 60 TABLET | Refills: 5 | OUTPATIENT
Start: 2017-05-11

## 2017-05-11 RX ORDER — NISOLDIPINE 34 MG/1
34 TABLET, FILM COATED, EXTENDED RELEASE ORAL DAILY
Qty: 30 TABLET | Refills: 5 | Status: SHIPPED | OUTPATIENT
Start: 2017-05-11 | End: 2017-11-14 | Stop reason: SDUPTHER

## 2017-05-11 RX ORDER — MELOXICAM 15 MG/1
TABLET ORAL
Qty: 30 TABLET | Refills: 5 | OUTPATIENT
Start: 2017-05-11

## 2017-05-11 RX ORDER — CLOBETASOL PROPIONATE 0.5 MG/G
CREAM TOPICAL DAILY
Qty: 60 G | Refills: 5 | Status: SHIPPED | OUTPATIENT
Start: 2017-05-11 | End: 2017-05-12 | Stop reason: SDUPTHER

## 2017-05-11 RX ORDER — FLUTICASONE PROPIONATE 0.05 %
CREAM (GRAM) TOPICAL DAILY
Qty: 60 G | Refills: 5 | Status: SHIPPED | OUTPATIENT
Start: 2017-05-11 | End: 2018-06-14 | Stop reason: SDUPTHER

## 2017-05-12 DIAGNOSIS — M25.561 CHRONIC PAIN OF RIGHT KNEE: ICD-10-CM

## 2017-05-12 DIAGNOSIS — L40.9 PSORIASIS: ICD-10-CM

## 2017-05-12 DIAGNOSIS — G89.29 CHRONIC PAIN OF RIGHT KNEE: ICD-10-CM

## 2017-05-12 RX ORDER — CALCIPOTRIENE 50 UG/G
CREAM TOPICAL 2 TIMES DAILY
Qty: 120 G | Refills: 5 | Status: SHIPPED | OUTPATIENT
Start: 2017-05-12 | End: 2018-06-14 | Stop reason: SDUPTHER

## 2017-05-12 RX ORDER — CLOBETASOL PROPIONATE 0.5 MG/G
CREAM TOPICAL DAILY
Qty: 60 G | Refills: 5 | Status: SHIPPED | OUTPATIENT
Start: 2017-05-12 | End: 2018-06-14 | Stop reason: SDUPTHER

## 2017-11-08 DIAGNOSIS — I10 BENIGN ESSENTIAL HYPERTENSION: ICD-10-CM

## 2017-11-08 DIAGNOSIS — R60.9 PERIPHERAL EDEMA: ICD-10-CM

## 2017-11-08 DIAGNOSIS — M15.3 POST-TRAUMATIC OSTEOARTHRITIS OF MULTIPLE JOINTS: ICD-10-CM

## 2017-11-08 RX ORDER — MELOXICAM 15 MG/1
TABLET ORAL
Qty: 30 TABLET | Refills: 5 | OUTPATIENT
Start: 2017-11-08

## 2017-11-08 RX ORDER — FUROSEMIDE 40 MG/1
TABLET ORAL
Qty: 30 TABLET | Refills: 5 | OUTPATIENT
Start: 2017-11-08

## 2017-11-08 RX ORDER — LISINOPRIL AND HYDROCHLOROTHIAZIDE 20; 12.5 MG/1; MG/1
TABLET ORAL
Qty: 60 TABLET | Refills: 5 | OUTPATIENT
Start: 2017-11-08

## 2017-11-08 RX ORDER — NISOLDIPINE 34 MG/1
TABLET, FILM COATED, EXTENDED RELEASE ORAL
Qty: 30 TABLET | Refills: 5 | OUTPATIENT
Start: 2017-11-08

## 2017-11-14 ENCOUNTER — TELEPHONE (OUTPATIENT)
Dept: FAMILY MEDICINE CLINIC | Facility: CLINIC | Age: 67
End: 2017-11-14

## 2017-11-14 DIAGNOSIS — R60.9 PERIPHERAL EDEMA: ICD-10-CM

## 2017-11-14 DIAGNOSIS — I10 BENIGN ESSENTIAL HYPERTENSION: ICD-10-CM

## 2017-11-14 RX ORDER — NISOLDIPINE 34 MG/1
34 TABLET, FILM COATED, EXTENDED RELEASE ORAL DAILY
Qty: 30 TABLET | Refills: 0 | Status: SHIPPED | OUTPATIENT
Start: 2017-11-14 | End: 2017-11-20 | Stop reason: SDUPTHER

## 2017-11-14 RX ORDER — FUROSEMIDE 40 MG/1
40 TABLET ORAL DAILY
Qty: 30 TABLET | Refills: 0 | Status: SHIPPED | OUTPATIENT
Start: 2017-11-14 | End: 2017-11-20 | Stop reason: SDUPTHER

## 2017-11-14 RX ORDER — LISINOPRIL AND HYDROCHLOROTHIAZIDE 20; 12.5 MG/1; MG/1
2 TABLET ORAL DAILY
Qty: 60 TABLET | Refills: 0 | Status: SHIPPED | OUTPATIENT
Start: 2017-11-14 | End: 2017-11-20 | Stop reason: SDUPTHER

## 2017-11-20 ENCOUNTER — OFFICE VISIT (OUTPATIENT)
Dept: FAMILY MEDICINE CLINIC | Facility: CLINIC | Age: 67
End: 2017-11-20

## 2017-11-20 VITALS
TEMPERATURE: 98.5 F | BODY MASS INDEX: 47.74 KG/M2 | SYSTOLIC BLOOD PRESSURE: 128 MMHG | RESPIRATION RATE: 20 BRPM | WEIGHT: 315 LBS | HEART RATE: 106 BPM | DIASTOLIC BLOOD PRESSURE: 84 MMHG | HEIGHT: 68 IN

## 2017-11-20 DIAGNOSIS — R60.9 PERIPHERAL EDEMA: ICD-10-CM

## 2017-11-20 DIAGNOSIS — M15.3 POST-TRAUMATIC OSTEOARTHRITIS OF MULTIPLE JOINTS: ICD-10-CM

## 2017-11-20 DIAGNOSIS — Z23 IMMUNIZATION DUE: ICD-10-CM

## 2017-11-20 DIAGNOSIS — M25.561 CHRONIC PAIN OF RIGHT KNEE: ICD-10-CM

## 2017-11-20 DIAGNOSIS — I10 BENIGN ESSENTIAL HYPERTENSION: Primary | ICD-10-CM

## 2017-11-20 DIAGNOSIS — G89.29 CHRONIC PAIN OF RIGHT KNEE: ICD-10-CM

## 2017-11-20 LAB
ALBUMIN SERPL-MCNC: 4.9 G/DL (ref 3.5–5.2)
ALBUMIN/GLOB SERPL: 1.5 G/DL
ALP SERPL-CCNC: 116 U/L (ref 39–117)
ALT SERPL-CCNC: 13 U/L (ref 1–41)
AST SERPL-CCNC: 17 U/L (ref 1–40)
BASOPHILS # BLD AUTO: 0.03 10*3/MM3 (ref 0–0.2)
BASOPHILS NFR BLD AUTO: 0.2 % (ref 0–1.5)
BILIRUB SERPL-MCNC: 0.4 MG/DL (ref 0.1–1.2)
BUN SERPL-MCNC: 24 MG/DL (ref 8–23)
BUN/CREAT SERPL: 17.5 (ref 7–25)
CALCIUM SERPL-MCNC: 9.6 MG/DL (ref 8.6–10.5)
CHLORIDE SERPL-SCNC: 98 MMOL/L (ref 98–107)
CHOLEST SERPL-MCNC: 216 MG/DL (ref 0–200)
CO2 SERPL-SCNC: 27.1 MMOL/L (ref 22–29)
CREAT SERPL-MCNC: 1.37 MG/DL (ref 0.76–1.27)
EOSINOPHIL # BLD AUTO: 0.21 10*3/MM3 (ref 0–0.7)
EOSINOPHIL NFR BLD AUTO: 1.6 % (ref 0.3–6.2)
ERYTHROCYTE [DISTWIDTH] IN BLOOD BY AUTOMATED COUNT: 15.5 % (ref 11.5–14.5)
GFR SERPLBLD CREATININE-BSD FMLA CKD-EPI: 52 ML/MIN/1.73
GFR SERPLBLD CREATININE-BSD FMLA CKD-EPI: 63 ML/MIN/1.73
GLOBULIN SER CALC-MCNC: 3.2 GM/DL
GLUCOSE SERPL-MCNC: 120 MG/DL (ref 65–99)
HCT VFR BLD AUTO: 44.9 % (ref 40.4–52.2)
HDLC SERPL-MCNC: 40 MG/DL (ref 40–60)
HGB BLD-MCNC: 14.6 G/DL (ref 13.7–17.6)
IMM GRANULOCYTES # BLD: 0.06 10*3/MM3 (ref 0–0.03)
IMM GRANULOCYTES NFR BLD: 0.4 % (ref 0–0.5)
LDLC SERPL CALC-MCNC: 138 MG/DL (ref 0–100)
LYMPHOCYTES # BLD AUTO: 1.65 10*3/MM3 (ref 0.9–4.8)
LYMPHOCYTES NFR BLD AUTO: 12.4 % (ref 19.6–45.3)
MCH RBC QN AUTO: 28.9 PG (ref 27–32.7)
MCHC RBC AUTO-ENTMCNC: 32.5 G/DL (ref 32.6–36.4)
MCV RBC AUTO: 88.7 FL (ref 79.8–96.2)
MONOCYTES # BLD AUTO: 1.41 10*3/MM3 (ref 0.2–1.2)
MONOCYTES NFR BLD AUTO: 10.6 % (ref 5–12)
NEUTROPHILS # BLD AUTO: 9.99 10*3/MM3 (ref 1.9–8.1)
NEUTROPHILS NFR BLD AUTO: 74.8 % (ref 42.7–76)
PLATELET # BLD AUTO: 288 10*3/MM3 (ref 140–500)
POTASSIUM SERPL-SCNC: 4.2 MMOL/L (ref 3.5–5.2)
PROT SERPL-MCNC: 8.1 G/DL (ref 6–8.5)
RBC # BLD AUTO: 5.06 10*6/MM3 (ref 4.6–6)
SODIUM SERPL-SCNC: 142 MMOL/L (ref 136–145)
TRIGL SERPL-MCNC: 190 MG/DL (ref 0–150)
TSH SERPL DL<=0.005 MIU/L-ACNC: 4.18 MIU/ML (ref 0.27–4.2)
VLDLC SERPL CALC-MCNC: 38 MG/DL (ref 5–40)
WBC # BLD AUTO: 13.35 10*3/MM3 (ref 4.5–10.7)

## 2017-11-20 PROCEDURE — G0008 ADMIN INFLUENZA VIRUS VAC: HCPCS | Performed by: FAMILY MEDICINE

## 2017-11-20 PROCEDURE — 99214 OFFICE O/P EST MOD 30 MIN: CPT | Performed by: FAMILY MEDICINE

## 2017-11-20 PROCEDURE — 90670 PCV13 VACCINE IM: CPT | Performed by: FAMILY MEDICINE

## 2017-11-20 PROCEDURE — 90686 IIV4 VACC NO PRSV 0.5 ML IM: CPT | Performed by: FAMILY MEDICINE

## 2017-11-20 PROCEDURE — G0009 ADMIN PNEUMOCOCCAL VACCINE: HCPCS | Performed by: FAMILY MEDICINE

## 2017-11-20 RX ORDER — LISINOPRIL AND HYDROCHLOROTHIAZIDE 20; 12.5 MG/1; MG/1
2 TABLET ORAL DAILY
Qty: 60 TABLET | Refills: 5 | Status: SHIPPED | OUTPATIENT
Start: 2017-11-20 | End: 2018-06-14 | Stop reason: SDUPTHER

## 2017-11-20 RX ORDER — NISOLDIPINE 34 MG/1
34 TABLET, FILM COATED, EXTENDED RELEASE ORAL DAILY
Qty: 30 TABLET | Refills: 5 | Status: SHIPPED | OUTPATIENT
Start: 2017-11-20 | End: 2018-06-14 | Stop reason: SDUPTHER

## 2017-11-20 RX ORDER — MELOXICAM 15 MG/1
15 TABLET ORAL DAILY
Qty: 30 TABLET | Refills: 5 | Status: SHIPPED | OUTPATIENT
Start: 2017-11-20 | End: 2018-06-14

## 2017-11-20 RX ORDER — FUROSEMIDE 40 MG/1
40 TABLET ORAL DAILY
Qty: 30 TABLET | Refills: 5 | Status: SHIPPED | OUTPATIENT
Start: 2017-11-20 | End: 2018-06-14 | Stop reason: SDUPTHER

## 2017-11-20 NOTE — PROGRESS NOTES
Subjective   Ganesh Viera is a 67 y.o. male.     History of Present Illness     Chief Complaint:   Chief Complaint   Patient presents with   • Hypertension     MED REFILL    • Arthritis       Ganesh Viera 67 y.o. male who presents today for Medical Management of the below listed issues and medication refills.  he has a problem list of   Patient Active Problem List   Diagnosis   • Benign essential hypertension   • History of CHF (congestive heart failure)   • Post-traumatic osteoarthritis of multiple joints   • Peripheral edema   • Psoriasis   • Leukocytosis   .  Since the last visit, he has overall felt well.  he has been compliant with   Current Outpatient Prescriptions:   •  diclofenac (VOLTAREN) 1 % gel gel, Apply 4 g topically 4 (Four) Times a Day., Disp: 5 tube, Rfl: 5  •  furosemide (LASIX) 40 MG tablet, Take 1 tablet by mouth Daily., Disp: 30 tablet, Rfl: 5  •  lisinopril-hydrochlorothiazide (PRINZIDE,ZESTORETIC) 20-12.5 MG per tablet, Take 2 tablets by mouth Daily., Disp: 60 tablet, Rfl: 5  •  meloxicam (MOBIC) 15 MG tablet, Take 1 tablet by mouth Daily., Disp: 30 tablet, Rfl: 5  •  nisoldipine (SULAR) 34 MG 24 hr tablet, Take 1 tablet by mouth Daily., Disp: 30 tablet, Rfl: 5  •  Acetaminophen (TYLENOL ARTHRITIS PAIN PO), Take  by mouth 2 (Two) Times a Day., Disp: , Rfl:   •  aspirin 81 MG EC tablet, Take 81 mg by mouth daily., Disp: , Rfl:   •  calcipotriene (DOVONEX) 0.005 % cream, Apply  topically 2 (Two) Times a Day., Disp: 120 g, Rfl: 5  •  clobetasol (TEMOVATE) 0.05 % cream, Apply  topically Daily., Disp: 60 g, Rfl: 5  •  fluticasone (CUTIVATE) 0.05 % cream, Apply  topically Daily., Disp: 60 g, Rfl: 5  •  Multiple Vitamins-Minerals (MULTIVITAMIN ADULT PO), Take  by mouth., Disp: , Rfl:   •  predniSONE (DELTASONE) 20 MG tablet, Take 1 tablet by mouth 2 (Two) Times a Day., Disp: 14 tablet, Rfl: 0  •  Thiamine HCl (VITAMIN B-1) 50 MG tablet, Take 50 mg by mouth daily., Disp: , Rfl: .  he denies  "medication side effects.    He has seen [facility] for elevated WBC and thought to be reactive.    All of the chronic condition(s) listed above are stable w/o issues.    /84  Pulse 106  Temp 98.5 °F (36.9 °C) (Oral)   Resp 20  Ht 68\" (172.7 cm)  Wt (!) 315 lb (143 kg)  BMI 47.9 kg/m2    Results for orders placed or performed in visit on 04/24/17   CBC Auto Differential   Result Value Ref Range    WBC 13.24 (H) 4.00 - 10.00 10*3/mm3    RBC 4.89 4.30 - 5.50 10*6/mm3    Hemoglobin 14.0 13.5 - 16.5 g/dL    Hematocrit 42.2 40.0 - 49.0 %    MCV 86.3 83.0 - 97.0 fL    MCH 28.6 27.0 - 33.0 pg    MCHC 33.2 32.0 - 35.0 g/dL    RDW 15.1 (H) 11.7 - 14.5 %    RDW-SD 47.6 37.0 - 49.0 fl    MPV 10.6 8.9 - 12.1 fL    Platelets 329 150 - 375 10*3/mm3    Neutrophil % 73.1 39.0 - 75.0 %    Lymphocyte % 12.6 (L) 20.0 - 49.0 %    Monocyte % 11.4 4.0 - 12.0 %    Eosinophil % 1.4 1.0 - 5.0 %    Basophil % 0.4 0.0 - 1.1 %    Immature Grans % 1.1 (H) 0.0 - 0.5 %    Neutrophils, Absolute 9.68 (H) 1.50 - 7.00 10*3/mm3    Lymphocytes, Absolute 1.67 1.00 - 3.50 10*3/mm3    Monocytes, Absolute 1.51 (H) 0.25 - 0.80 10*3/mm3    Eosinophils, Absolute 0.18 0.00 - 0.36 10*3/mm3    Basophils, Absolute 0.05 0.00 - 0.10 10*3/mm3    Immature Grans, Absolute 0.15 (H) 0.00 - 0.03 10*3/mm3    nRBC 0.0 0.0 - 0.0 /100 WBC           The following portions of the patient's history were reviewed and updated as appropriate: allergies, current medications, past family history, past medical history, past social history, past surgical history and problem list.    Review of Systems   Constitutional: Negative for activity change, chills, fatigue and fever.   Respiratory: Negative for cough and shortness of breath.    Cardiovascular: Negative for chest pain and palpitations.   Gastrointestinal: Negative for abdominal pain.   Endocrine: Negative for cold intolerance.   Psychiatric/Behavioral: Negative for behavioral problems and dysphoric mood. The patient is " not nervous/anxious.        Objective   Physical Exam   Constitutional: He appears well-developed and well-nourished.   Neck: Neck supple. No thyromegaly present.   Cardiovascular: Normal rate and regular rhythm.    No murmur heard.  Pulmonary/Chest: Effort normal and breath sounds normal.   Abdominal: Bowel sounds are normal.   Psychiatric: He has a normal mood and affect. His behavior is normal.   Nursing note and vitals reviewed.      Assessment/Plan   Ganesh was seen today for hypertension and arthritis.    Diagnoses and all orders for this visit:    Benign essential hypertension  -     lisinopril-hydrochlorothiazide (PRINZIDE,ZESTORETIC) 20-12.5 MG per tablet; Take 2 tablets by mouth Daily.  -     nisoldipine (SULAR) 34 MG 24 hr tablet; Take 1 tablet by mouth Daily.  -     Comprehensive metabolic panel  -     Lipid panel  -     CBC and Differential  -     TSH    Chronic pain of right knee  -     diclofenac (VOLTAREN) 1 % gel gel; Apply 4 g topically 4 (Four) Times a Day.    Post-traumatic osteoarthritis of multiple joints  -     meloxicam (MOBIC) 15 MG tablet; Take 1 tablet by mouth Daily.    Peripheral edema  -     furosemide (LASIX) 40 MG tablet; Take 1 tablet by mouth Daily.    Immunization due  -     Flu Vaccine Quad PF 3YR+  -     Pneumococcal Conjugate Vaccine 13-Valent All

## 2017-12-27 DIAGNOSIS — N28.9 RENAL INSUFFICIENCY: Primary | ICD-10-CM

## 2017-12-28 LAB
BUN SERPL-MCNC: 18 MG/DL (ref 8–23)
BUN/CREAT SERPL: 14.6 (ref 7–25)
CALCIUM SERPL-MCNC: 9 MG/DL (ref 8.6–10.5)
CHLORIDE SERPL-SCNC: 97 MMOL/L (ref 98–107)
CO2 SERPL-SCNC: 28.1 MMOL/L (ref 22–29)
CREAT SERPL-MCNC: 1.23 MG/DL (ref 0.76–1.27)
GLUCOSE SERPL-MCNC: 91 MG/DL (ref 65–99)
POTASSIUM SERPL-SCNC: 4.3 MMOL/L (ref 3.5–5.2)
SODIUM SERPL-SCNC: 141 MMOL/L (ref 136–145)

## 2018-04-12 DIAGNOSIS — G89.29 CHRONIC PAIN OF RIGHT KNEE: ICD-10-CM

## 2018-04-12 DIAGNOSIS — M25.561 CHRONIC PAIN OF RIGHT KNEE: ICD-10-CM

## 2018-05-08 DIAGNOSIS — G89.29 CHRONIC PAIN OF RIGHT KNEE: ICD-10-CM

## 2018-05-08 DIAGNOSIS — M25.561 CHRONIC PAIN OF RIGHT KNEE: ICD-10-CM

## 2018-06-03 DIAGNOSIS — I10 BENIGN ESSENTIAL HYPERTENSION: ICD-10-CM

## 2018-06-03 DIAGNOSIS — R60.9 PERIPHERAL EDEMA: ICD-10-CM

## 2018-06-04 RX ORDER — FUROSEMIDE 40 MG/1
TABLET ORAL
Qty: 30 TABLET | Refills: 5 | OUTPATIENT
Start: 2018-06-04

## 2018-06-04 RX ORDER — LISINOPRIL AND HYDROCHLOROTHIAZIDE 20; 12.5 MG/1; MG/1
TABLET ORAL
Qty: 60 TABLET | Refills: 5 | OUTPATIENT
Start: 2018-06-04

## 2018-06-04 RX ORDER — NISOLDIPINE 34 MG/1
TABLET, FILM COATED, EXTENDED RELEASE ORAL
Qty: 30 TABLET | Refills: 5 | OUTPATIENT
Start: 2018-06-04

## 2018-06-05 DIAGNOSIS — G89.29 CHRONIC PAIN OF RIGHT KNEE: ICD-10-CM

## 2018-06-05 DIAGNOSIS — M25.561 CHRONIC PAIN OF RIGHT KNEE: ICD-10-CM

## 2018-06-14 ENCOUNTER — OFFICE VISIT (OUTPATIENT)
Dept: FAMILY MEDICINE CLINIC | Facility: CLINIC | Age: 68
End: 2018-06-14

## 2018-06-14 VITALS
DIASTOLIC BLOOD PRESSURE: 84 MMHG | SYSTOLIC BLOOD PRESSURE: 138 MMHG | WEIGHT: 299 LBS | HEIGHT: 68 IN | RESPIRATION RATE: 16 BRPM | OXYGEN SATURATION: 98 % | BODY MASS INDEX: 45.31 KG/M2 | TEMPERATURE: 98.4 F | HEART RATE: 86 BPM

## 2018-06-14 DIAGNOSIS — M25.561 CHRONIC PAIN OF RIGHT KNEE: ICD-10-CM

## 2018-06-14 DIAGNOSIS — I10 BENIGN ESSENTIAL HYPERTENSION: Primary | ICD-10-CM

## 2018-06-14 DIAGNOSIS — G89.29 CHRONIC PAIN OF RIGHT KNEE: ICD-10-CM

## 2018-06-14 DIAGNOSIS — I48.91 ATRIAL FIBRILLATION, NEW ONSET (HCC): ICD-10-CM

## 2018-06-14 DIAGNOSIS — R60.9 PERIPHERAL EDEMA: ICD-10-CM

## 2018-06-14 DIAGNOSIS — Z00.00 WELCOME TO MEDICARE PREVENTIVE VISIT: ICD-10-CM

## 2018-06-14 DIAGNOSIS — L40.9 PSORIASIS: ICD-10-CM

## 2018-06-14 PROCEDURE — 93000 ELECTROCARDIOGRAM COMPLETE: CPT | Performed by: FAMILY MEDICINE

## 2018-06-14 PROCEDURE — 99214 OFFICE O/P EST MOD 30 MIN: CPT | Performed by: FAMILY MEDICINE

## 2018-06-14 RX ORDER — LISINOPRIL AND HYDROCHLOROTHIAZIDE 20; 12.5 MG/1; MG/1
2 TABLET ORAL DAILY
Qty: 60 TABLET | Refills: 5 | Status: SHIPPED | OUTPATIENT
Start: 2018-06-14 | End: 2018-12-10 | Stop reason: SDUPTHER

## 2018-06-14 RX ORDER — FLUTICASONE PROPIONATE 0.05 %
CREAM (GRAM) TOPICAL DAILY
Qty: 60 G | Refills: 5 | Status: SHIPPED | OUTPATIENT
Start: 2018-06-14 | End: 2019-07-11 | Stop reason: SDUPTHER

## 2018-06-14 RX ORDER — CLOBETASOL PROPIONATE 0.5 MG/G
CREAM TOPICAL DAILY
Qty: 60 G | Refills: 5 | Status: SHIPPED | OUTPATIENT
Start: 2018-06-14 | End: 2019-07-03 | Stop reason: SDUPTHER

## 2018-06-14 RX ORDER — MELOXICAM 15 MG/1
15 TABLET ORAL DAILY
Qty: 30 TABLET | Refills: 5 | Status: CANCELLED | OUTPATIENT
Start: 2018-06-14

## 2018-06-14 RX ORDER — CALCIPOTRIENE 50 UG/G
CREAM TOPICAL
Qty: 120 G | Refills: 5 | Status: SHIPPED | OUTPATIENT
Start: 2018-06-14 | End: 2019-07-11 | Stop reason: SDUPTHER

## 2018-06-14 RX ORDER — NISOLDIPINE 34 MG/1
34 TABLET, FILM COATED, EXTENDED RELEASE ORAL DAILY
Qty: 30 TABLET | Refills: 5 | Status: SHIPPED | OUTPATIENT
Start: 2018-06-14 | End: 2018-12-10 | Stop reason: SDUPTHER

## 2018-06-14 RX ORDER — FUROSEMIDE 40 MG/1
40 TABLET ORAL DAILY
Qty: 30 TABLET | Refills: 5 | Status: SHIPPED | OUTPATIENT
Start: 2018-06-14 | End: 2018-12-10 | Stop reason: SDUPTHER

## 2018-06-14 NOTE — PROGRESS NOTES
Subjective   Ganesh Viera is a 67 y.o. male.     History of Present Illness     Chief Complaint:   Chief Complaint   Patient presents with   • Hypertension   • Psoriasis   • Knee Pain       Ganesh Viera 67 y.o. male who presents today for Medical Management of the below listed issues and medication refills.  he has a problem list of   Patient Active Problem List   Diagnosis   • Benign essential hypertension   • History of CHF (congestive heart failure)   • Post-traumatic osteoarthritis of multiple joints   • Peripheral edema   • Psoriasis   • Leukocytosis   • Atrial fibrillation, new onset   .  Since the last visit, he has overall felt well.  he has been compliant with   Current Outpatient Prescriptions:   •  calcipotriene (DOVONEX) 0.005 % cream, Apply  topically Daily., Disp: 120 g, Rfl: 5  •  clobetasol (TEMOVATE) 0.05 % cream, Apply  topically Daily., Disp: 60 g, Rfl: 5  •  fluticasone (CUTIVATE) 0.05 % cream, Apply  topically Daily., Disp: 60 g, Rfl: 5  •  VOLTAREN 1 % gel gel, apply 4 grams to affected area four times a day, Disp: 500 g, Rfl: 0  •  Acetaminophen (TYLENOL ARTHRITIS PAIN PO), Take  by mouth 2 (Two) Times a Day., Disp: , Rfl:   •  furosemide (LASIX) 40 MG tablet, Take 1 tablet by mouth Daily., Disp: 30 tablet, Rfl: 5  •  lisinopril-hydrochlorothiazide (PRINZIDE,ZESTORETIC) 20-12.5 MG per tablet, Take 2 tablets by mouth Daily., Disp: 60 tablet, Rfl: 5  •  Multiple Vitamins-Minerals (MULTIVITAMIN ADULT PO), Take  by mouth., Disp: , Rfl:   •  nisoldipine (SULAR) 34 MG 24 hr tablet, Take 1 tablet by mouth Daily., Disp: 30 tablet, Rfl: 5  •  Rivaroxaban 15 & 20 MG tablet therapy pack, Take as directed, Disp: 1 each, Rfl: 0  •  Thiamine HCl (VITAMIN B-1) 50 MG tablet, Take 50 mg by mouth daily., Disp: , Rfl: .  he denies medication side effects.    All of the chronic condition(s) listed above are stable w/o issues.    /84   Pulse 86   Temp 98.4 °F (36.9 °C) (Oral)   Resp 16   Ht 172.7  "cm (68\")   Wt 136 kg (299 lb)   SpO2 98%   BMI 45.46 kg/m²     Results for orders placed or performed in visit on 12/27/17   Basic Metabolic Panel   Result Value Ref Range    Glucose 91 65 - 99 mg/dL    BUN 18 8 - 23 mg/dL    Creatinine 1.23 0.76 - 1.27 mg/dL    eGFR Non African Am 59 (L) >60 mL/min/1.73    eGFR African Am 72 >60 mL/min/1.73    BUN/Creatinine Ratio 14.6 7.0 - 25.0    Sodium 141 136 - 145 mmol/L    Potassium 4.3 3.5 - 5.2 mmol/L    Chloride 97 (L) 98 - 107 mmol/L    Total CO2 28.1 22.0 - 29.0 mmol/L    Calcium 9.0 8.6 - 10.5 mg/dL           The following portions of the patient's history were reviewed and updated as appropriate: allergies, current medications, past family history, past medical history, past social history, past surgical history and problem list.    Review of Systems   Constitutional: Negative for activity change, chills, fatigue and fever.   Respiratory: Negative for cough and shortness of breath.    Cardiovascular: Negative for chest pain and palpitations.   Gastrointestinal: Negative for abdominal pain.   Endocrine: Negative for cold intolerance.   Psychiatric/Behavioral: Negative for behavioral problems and dysphoric mood. The patient is not nervous/anxious.        Objective   Physical Exam   Constitutional: He appears well-developed and well-nourished.   Neck: Neck supple. No thyromegaly present.   Cardiovascular: Normal rate.  An irregularly irregular rhythm present.   No murmur heard.  Pulmonary/Chest: Effort normal and breath sounds normal.   Abdominal: Bowel sounds are normal. There is no tenderness.   Psychiatric: He has a normal mood and affect. His behavior is normal.   Nursing note and vitals reviewed.      Assessment/Plan   Ganesh was seen today for hypertension, psoriasis and knee pain.    Diagnoses and all orders for this visit:    Benign essential hypertension  -     lisinopril-hydrochlorothiazide (PRINZIDE,ZESTORETIC) 20-12.5 MG per tablet; Take 2 tablets by mouth " Daily.  -     nisoldipine (SULAR) 34 MG 24 hr tablet; Take 1 tablet by mouth Daily.  -     Comprehensive metabolic panel  -     Lipid panel  -     CBC and Differential  -     TSH    Peripheral edema  -     furosemide (LASIX) 40 MG tablet; Take 1 tablet by mouth Daily.    Psoriasis  -     fluticasone (CUTIVATE) 0.05 % cream; Apply  topically Daily.  -     clobetasol (TEMOVATE) 0.05 % cream; Apply  topically Daily.  -     calcipotriene (DOVONEX) 0.005 % cream; Apply  topically Daily.    Atrial fibrillation, new onset  -     ECG 12 Lead  -     Ambulatory Referral to Cardiology  -     Rivaroxaban 15 & 20 MG tablet therapy pack; Take as directed    Welcome to Medicare preventive visit  Comments:  for labs only, do not bill  Orders:  -     PSA    Other orders  -     Cancel: meloxicam (MOBIC) 15 MG tablet; Take 1 tablet by mouth Daily.    Pt declines c-scope but gave literature for Cologuard.

## 2018-06-14 NOTE — PROGRESS NOTES
Procedure     ECG 12 Lead  Date/Time: 6/14/2018 4:30 PM  Performed by: TESFAYE CHANCE  Authorized by: TESFAYE CHANCE   Interpreted by ED physician  Comparison: not compared with previous ECG   Previous ECG: no previous ECG available  Rhythm: atrial fibrillation  Rate: normal  Conduction: conduction normal  ST Segments: ST segments normal  T Waves: T waves normal  QRS axis: normal  Clinical impression: abnormal ECG

## 2018-06-20 ENCOUNTER — OFFICE VISIT (OUTPATIENT)
Dept: CARDIOLOGY | Facility: CLINIC | Age: 68
End: 2018-06-20

## 2018-06-20 VITALS
BODY MASS INDEX: 44.41 KG/M2 | SYSTOLIC BLOOD PRESSURE: 154 MMHG | WEIGHT: 293 LBS | HEART RATE: 109 BPM | HEIGHT: 68 IN | DIASTOLIC BLOOD PRESSURE: 98 MMHG

## 2018-06-20 DIAGNOSIS — I10 ESSENTIAL HYPERTENSION: ICD-10-CM

## 2018-06-20 DIAGNOSIS — I48.0 PAROXYSMAL ATRIAL FIBRILLATION (HCC): Primary | ICD-10-CM

## 2018-06-20 PROCEDURE — 99204 OFFICE O/P NEW MOD 45 MIN: CPT | Performed by: INTERNAL MEDICINE

## 2018-06-20 PROCEDURE — 93000 ELECTROCARDIOGRAM COMPLETE: CPT | Performed by: INTERNAL MEDICINE

## 2018-06-20 RX ORDER — METOPROLOL SUCCINATE 25 MG/1
25 TABLET, EXTENDED RELEASE ORAL DAILY
Qty: 30 TABLET | Refills: 11 | Status: SHIPPED | OUTPATIENT
Start: 2018-06-20 | End: 2018-07-25

## 2018-06-20 NOTE — PROGRESS NOTES
Date of Office Visit: 2018  Encounter Provider: Az Moreno MD  Place of Service: [facility] CARDIOLOGY  Patient Name: Ganesh Viera  :1950  Ganesh Gomez MD    Chief Complaint   Patient presents with   • Atrial Fibrillation     History of Present Illness    The patient is a 67-year-old white male with a history of hypertension who presents to the office today at the request of Dr. Ganesh Gomez for new onset atrial fibrillation.    The patient states he went for routine physical examination.  An irregularity was heard on his examination and electrocardiogram confirmed atrial fibrillation.  This patient has no symptoms at all.  He denies any palpitations, lightheadedness, peripheral edema, orthopnea, paroxysmal nocturnal dyspnea.  The patient denies any prior cardiac history.  His risk factors for coronary disease include a family history and hypertension.  He also has a very remote smoking history.    Past Medical History:   Diagnosis Date   • Benign essential hypertension    • CHF (congestive heart failure)    • Eye exam, routine    • H/O Leukocytosis    • Osteoarthritis    • Peripheral edema    • Psoriasis          No past surgical history on file.        Current Outpatient Prescriptions:   •  Acetaminophen (TYLENOL ARTHRITIS PAIN PO), Take  by mouth 2 (Two) Times a Day., Disp: , Rfl:   •  calcipotriene (DOVONEX) 0.005 % cream, Apply  topically Daily., Disp: 120 g, Rfl: 5  •  clobetasol (TEMOVATE) 0.05 % cream, Apply  topically Daily., Disp: 60 g, Rfl: 5  •  fluticasone (CUTIVATE) 0.05 % cream, Apply  topically Daily., Disp: 60 g, Rfl: 5  •  furosemide (LASIX) 40 MG tablet, Take 1 tablet by mouth Daily., Disp: 30 tablet, Rfl: 5  •  lisinopril-hydrochlorothiazide (PRINZIDE,ZESTORETIC) 20-12.5 MG per tablet, Take 2 tablets by mouth Daily., Disp: 60 tablet, Rfl: 5  •  Multiple Vitamins-Minerals (MULTIVITAMIN ADULT PO), Take  by mouth., Disp: , Rfl:   •   "nisoldipine (SULAR) 34 MG 24 hr tablet, Take 1 tablet by mouth Daily., Disp: 30 tablet, Rfl: 5  •  Rivaroxaban 15 & 20 MG tablet therapy pack, Take as directed, Disp: 1 each, Rfl: 0  •  Thiamine HCl (VITAMIN B-1) 50 MG tablet, Take 50 mg by mouth daily., Disp: , Rfl:   •  VOLTAREN 1 % gel gel, apply 4 grams to affected area four times a day, Disp: 500 g, Rfl: 0  •  metoprolol succinate XL (TOPROL-XL) 25 MG 24 hr tablet, Take 1 tablet by mouth Daily., Disp: 30 tablet, Rfl: 11      Social History     Social History   • Marital status:      Spouse name: N/A   • Number of children: N/A   • Years of education: High school     Occupational History   • [employer] Retired     Social History Main Topics   • Smoking status: Former Smoker     Packs/day: 1.00     Years: 10.00     Types: Cigarettes   • Smokeless tobacco: Never Used   • Alcohol use Yes      Comment: occ   • Drug use: No   • Sexual activity: Not on file     Other Topics Concern   • Not on file     Social History Narrative   • No narrative on file         Review of Systems   Constitution: Negative.   HENT: Negative.    Eyes: Negative.    Cardiovascular: Negative.    Respiratory: Negative.    Endocrine: Negative.    Skin: Negative.    Musculoskeletal: Negative.    Gastrointestinal: Negative.    Neurological: Negative.    Psychiatric/Behavioral: Negative.        Procedures      ECG 12 Lead  Date/Time: 6/20/2018 3:31 PM  Performed by: NOAH DOWLING  Authorized by: NOAH DOWLING   Comparison: compared with previous ECG from 6/14/2018  Similar to previous ECG  Rhythm: atrial fibrillation  Rate: tachycardic  Conduction: conduction normal  Other findings: PRWP                Objective:    /98   Pulse 109   Ht 172.7 cm (68\")   Wt 133 kg (293 lb)   BMI 44.55 kg/m²         Physical Exam   Constitutional: He is oriented to person, place, and time. He appears well-developed and well-nourished.   Overweight   HENT:   Head: Normocephalic.   Eyes: Pupils " are equal, round, and reactive to light.   Neck: Normal range of motion. No JVD present. Carotid bruit is not present. No thyromegaly present.   Cardiovascular: Normal rate, S1 normal, S2 normal, normal heart sounds and intact distal pulses.  An irregularly irregular rhythm present. Exam reveals no gallop and no friction rub.    No murmur heard.  Pulmonary/Chest: Effort normal and breath sounds normal.   Abdominal: Soft. Bowel sounds are normal.   Musculoskeletal: He exhibits no edema.   Neurological: He is alert and oriented to person, place, and time.   Skin: Skin is warm, dry and intact. No erythema.   Psychiatric: He has a normal mood and affect.   Vitals reviewed.          Assessment:      1. Atrial Fibrillation and Atrial Flutter  Assessment  • The patient has atrial fibrillation-initial episode  • The patient's CHADS2-VASc score is 2  • A [id number] score of 2 or more is considered a high risk for a thromboembolic event  • Rivaroxaban prescribed  An echocardiogram will be planned  2.  Hypertension: Uncontrolled.  I will add metoprolol succinate 25 mg daily for blood pressure assistance but also heart rate control  3.  Obesity, morbid: Dietary measures and physical activity stressed       Plan:   I will review the echocardiogram when complete.  Further recommendations will follow.  We may attempt to cardiovert him back to normal.

## 2018-06-21 LAB
ALBUMIN SERPL-MCNC: 4.6 G/DL (ref 3.5–5.2)
ALBUMIN/GLOB SERPL: 1.4 G/DL
ALP SERPL-CCNC: 98 U/L (ref 39–117)
ALT SERPL-CCNC: 8 U/L (ref 1–41)
AST SERPL-CCNC: 16 U/L (ref 1–40)
BASOPHILS # BLD AUTO: 0.04 10*3/MM3 (ref 0–0.2)
BASOPHILS NFR BLD AUTO: 0.2 % (ref 0–1.5)
BILIRUB SERPL-MCNC: 0.5 MG/DL (ref 0.1–1.2)
BUN SERPL-MCNC: 17 MG/DL (ref 8–23)
BUN/CREAT SERPL: 13 (ref 7–25)
CALCIUM SERPL-MCNC: 9.6 MG/DL (ref 8.6–10.5)
CHLORIDE SERPL-SCNC: 97 MMOL/L (ref 98–107)
CHOLEST SERPL-MCNC: 182 MG/DL (ref 0–200)
CO2 SERPL-SCNC: 28.4 MMOL/L (ref 22–29)
CREAT SERPL-MCNC: 1.31 MG/DL (ref 0.76–1.27)
EOSINOPHIL # BLD AUTO: 0.06 10*3/MM3 (ref 0–0.7)
EOSINOPHIL NFR BLD AUTO: 0.3 % (ref 0.3–6.2)
ERYTHROCYTE [DISTWIDTH] IN BLOOD BY AUTOMATED COUNT: 15.5 % (ref 11.5–14.5)
GFR SERPLBLD CREATININE-BSD FMLA CKD-EPI: 55 ML/MIN/1.73
GFR SERPLBLD CREATININE-BSD FMLA CKD-EPI: 67 ML/MIN/1.73
GLOBULIN SER CALC-MCNC: 3.3 GM/DL
GLUCOSE SERPL-MCNC: 101 MG/DL (ref 65–99)
HCT VFR BLD AUTO: 45.4 % (ref 40.4–52.2)
HDLC SERPL-MCNC: 41 MG/DL (ref 40–60)
HGB BLD-MCNC: 14.4 G/DL (ref 13.7–17.6)
IMM GRANULOCYTES # BLD: 0.04 10*3/MM3 (ref 0–0.03)
IMM GRANULOCYTES NFR BLD: 0.2 % (ref 0–0.5)
LDLC SERPL CALC-MCNC: 112 MG/DL (ref 0–100)
LYMPHOCYTES # BLD AUTO: 1.65 10*3/MM3 (ref 0.9–4.8)
LYMPHOCYTES NFR BLD AUTO: 9.4 % (ref 19.6–45.3)
MCH RBC QN AUTO: 28.4 PG (ref 27–32.7)
MCHC RBC AUTO-ENTMCNC: 31.7 G/DL (ref 32.6–36.4)
MCV RBC AUTO: 89.5 FL (ref 79.8–96.2)
MONOCYTES # BLD AUTO: 1.86 10*3/MM3 (ref 0.2–1.2)
MONOCYTES NFR BLD AUTO: 10.6 % (ref 5–12)
NEUTROPHILS # BLD AUTO: 13.89 10*3/MM3 (ref 1.9–8.1)
NEUTROPHILS NFR BLD AUTO: 79.3 % (ref 42.7–76)
PLATELET # BLD AUTO: 401 10*3/MM3 (ref 140–500)
POTASSIUM SERPL-SCNC: 4.1 MMOL/L (ref 3.5–5.2)
PROT SERPL-MCNC: 7.9 G/DL (ref 6–8.5)
PSA SERPL-MCNC: 4.12 NG/ML (ref 0–4)
RBC # BLD AUTO: 5.07 10*6/MM3 (ref 4.6–6)
SODIUM SERPL-SCNC: 142 MMOL/L (ref 136–145)
TRIGL SERPL-MCNC: 147 MG/DL (ref 0–150)
TSH SERPL DL<=0.005 MIU/L-ACNC: 2.34 MIU/ML (ref 0.27–4.2)
VLDLC SERPL CALC-MCNC: 29.4 MG/DL (ref 5–40)
WBC # BLD AUTO: 17.54 10*3/MM3 (ref 4.5–10.7)

## 2018-06-24 DIAGNOSIS — R97.20 ELEVATED PSA: Primary | ICD-10-CM

## 2018-06-24 DIAGNOSIS — Z12.2 ENCOUNTER FOR SCREENING FOR LUNG CANCER: ICD-10-CM

## 2018-06-24 DIAGNOSIS — D72.828 OTHER ELEVATED WHITE BLOOD CELL (WBC) COUNT: ICD-10-CM

## 2018-06-24 DIAGNOSIS — Z72.0 TOBACCO ABUSE: Primary | ICD-10-CM

## 2018-06-25 ENCOUNTER — RESULTS ENCOUNTER (OUTPATIENT)
Dept: FAMILY MEDICINE CLINIC | Facility: CLINIC | Age: 68
End: 2018-06-25

## 2018-06-25 ENCOUNTER — HOSPITAL ENCOUNTER (OUTPATIENT)
Dept: CARDIOLOGY | Facility: HOSPITAL | Age: 68
Discharge: HOME | End: 2018-06-25
Attending: INTERNAL MEDICINE | Admitting: INTERNAL MEDICINE

## 2018-06-25 VITALS
HEIGHT: 68 IN | HEART RATE: 88 BPM | BODY MASS INDEX: 44.41 KG/M2 | DIASTOLIC BLOOD PRESSURE: 72 MMHG | SYSTOLIC BLOOD PRESSURE: 126 MMHG | WEIGHT: 293 LBS

## 2018-06-25 DIAGNOSIS — I48.0 PAROXYSMAL ATRIAL FIBRILLATION (HCC): ICD-10-CM

## 2018-06-25 DIAGNOSIS — D72.828 OTHER ELEVATED WHITE BLOOD CELL (WBC) COUNT: ICD-10-CM

## 2018-06-25 LAB
ASCENDING AORTA: 4.2 CM
BH CV ECHO MEAS - ACS: 2.2 CM
BH CV ECHO MEAS - AO MAX PG (FULL): 7.7 MMHG
BH CV ECHO MEAS - AO MAX PG: 11.3 MMHG
BH CV ECHO MEAS - AO MEAN PG (FULL): 3.1 MMHG
BH CV ECHO MEAS - AO MEAN PG: 5.2 MMHG
BH CV ECHO MEAS - AO ROOT AREA (BSA CORRECTED): 1.6
BH CV ECHO MEAS - AO ROOT AREA: 11 CM^2
BH CV ECHO MEAS - AO ROOT DIAM: 3.7 CM
BH CV ECHO MEAS - AO V2 MAX: 168.3 CM/SEC
BH CV ECHO MEAS - AO V2 MEAN: 101.8 CM/SEC
BH CV ECHO MEAS - AO V2 VTI: 33 CM
BH CV ECHO MEAS - ASC AORTA: 4.2 CM
BH CV ECHO MEAS - AVA(I,A): 2 CM^2
BH CV ECHO MEAS - AVA(I,D): 2 CM^2
BH CV ECHO MEAS - AVA(V,A): 2 CM^2
BH CV ECHO MEAS - AVA(V,D): 2 CM^2
BH CV ECHO MEAS - BSA(HAYCOCK): 2.6 M^2
BH CV ECHO MEAS - BSA: 2.4 M^2
BH CV ECHO MEAS - BZI_BMI: 44.6 KILOGRAMS/M^2
BH CV ECHO MEAS - BZI_METRIC_HEIGHT: 172.7 CM
BH CV ECHO MEAS - BZI_METRIC_WEIGHT: 132.9 KG
BH CV ECHO MEAS - CONTRAST EF (2CH): 58.3 ML/M^2
BH CV ECHO MEAS - CONTRAST EF 4CH: 57.1 ML/M^2
BH CV ECHO MEAS - EDV(MOD-SP2): 96 ML
BH CV ECHO MEAS - EDV(MOD-SP4): 91 ML
BH CV ECHO MEAS - EDV(TEICH): 119.6 ML
BH CV ECHO MEAS - EF(CUBED): 60.5 %
BH CV ECHO MEAS - EF(MOD-BP): 58 %
BH CV ECHO MEAS - EF(MOD-SP2): 58.3 %
BH CV ECHO MEAS - EF(MOD-SP4): 57.1 %
BH CV ECHO MEAS - EF(TEICH): 51.8 %
BH CV ECHO MEAS - ESV(MOD-SP2): 40 ML
BH CV ECHO MEAS - ESV(MOD-SP4): 39 ML
BH CV ECHO MEAS - ESV(TEICH): 57.7 ML
BH CV ECHO MEAS - FS: 26.6 %
BH CV ECHO MEAS - IVS/LVPW: 1.2
BH CV ECHO MEAS - IVSD: 1.5 CM
BH CV ECHO MEAS - LAT PEAK E' VEL: 10 CM/SEC
BH CV ECHO MEAS - LV DIASTOLIC VOL/BSA (35-75): 37.9 ML/M^2
BH CV ECHO MEAS - LV MASS(C)D: 281.4 GRAMS
BH CV ECHO MEAS - LV MASS(C)DI: 117.1 GRAMS/M^2
BH CV ECHO MEAS - LV MAX PG: 3.7 MMHG
BH CV ECHO MEAS - LV MEAN PG: 2.1 MMHG
BH CV ECHO MEAS - LV SYSTOLIC VOL/BSA (12-30): 16.2 ML/M^2
BH CV ECHO MEAS - LV V1 MAX: 95.6 CM/SEC
BH CV ECHO MEAS - LV V1 MEAN: 67.2 CM/SEC
BH CV ECHO MEAS - LV V1 VTI: 18.7 CM
BH CV ECHO MEAS - LVIDD: 5 CM
BH CV ECHO MEAS - LVIDS: 3.7 CM
BH CV ECHO MEAS - LVLD AP2: 8.2 CM
BH CV ECHO MEAS - LVLD AP4: 8.6 CM
BH CV ECHO MEAS - LVLS AP2: 7.1 CM
BH CV ECHO MEAS - LVLS AP4: 6.9 CM
BH CV ECHO MEAS - LVOT AREA (M): 3.5 CM^2
BH CV ECHO MEAS - LVOT AREA: 3.6 CM^2
BH CV ECHO MEAS - LVOT DIAM: 2.1 CM
BH CV ECHO MEAS - LVPWD: 1.2 CM
BH CV ECHO MEAS - MED PEAK E' VEL: 8 CM/SEC
BH CV ECHO MEAS - MV A DUR: 0.12 SEC
BH CV ECHO MEAS - MV A MAX VEL: 108.2 CM/SEC
BH CV ECHO MEAS - MV DEC SLOPE: 297 CM/SEC^2
BH CV ECHO MEAS - MV DEC TIME: 0.28 SEC
BH CV ECHO MEAS - MV E MAX VEL: 73.7 CM/SEC
BH CV ECHO MEAS - MV E/A: 0.68
BH CV ECHO MEAS - MV MAX PG: 5.1 MMHG
BH CV ECHO MEAS - MV MEAN PG: 2 MMHG
BH CV ECHO MEAS - MV P1/2T MAX VEL: 66.5 CM/SEC
BH CV ECHO MEAS - MV P1/2T: 65.5 MSEC
BH CV ECHO MEAS - MV V2 MAX: 112.7 CM/SEC
BH CV ECHO MEAS - MV V2 MEAN: 64.7 CM/SEC
BH CV ECHO MEAS - MV V2 VTI: 26 CM
BH CV ECHO MEAS - MVA P1/2T LCG: 3.3 CM^2
BH CV ECHO MEAS - MVA(P1/2T): 3.4 CM^2
BH CV ECHO MEAS - MVA(VTI): 2.6 CM^2
BH CV ECHO MEAS - PA ACC TIME: 0.13 SEC
BH CV ECHO MEAS - PA MAX PG (FULL): 3.4 MMHG
BH CV ECHO MEAS - PA MAX PG: 5.2 MMHG
BH CV ECHO MEAS - PA PR(ACCEL): 18.8 MMHG
BH CV ECHO MEAS - PA V2 MAX: 114 CM/SEC
BH CV ECHO MEAS - PULM DIAS VEL: 34.3 CM/SEC
BH CV ECHO MEAS - PULM S/D: 1.4
BH CV ECHO MEAS - PULM SYS VEL: 48.2 CM/SEC
BH CV ECHO MEAS - PVA(V,A): 3 CM^2
BH CV ECHO MEAS - PVA(V,D): 3 CM^2
BH CV ECHO MEAS - QP/QS: 1
BH CV ECHO MEAS - RV MAX PG: 1.8 MMHG
BH CV ECHO MEAS - RV MEAN PG: 0.99 MMHG
BH CV ECHO MEAS - RV V1 MAX: 67.4 CM/SEC
BH CV ECHO MEAS - RV V1 MEAN: 44.7 CM/SEC
BH CV ECHO MEAS - RV V1 VTI: 13.4 CM
BH CV ECHO MEAS - RVOT AREA: 5.2 CM^2
BH CV ECHO MEAS - RVOT DIAM: 2.6 CM
BH CV ECHO MEAS - SI(AO): 150.7 ML/M^2
BH CV ECHO MEAS - SI(CUBED): 31.9 ML/M^2
BH CV ECHO MEAS - SI(LVOT): 28 ML/M^2
BH CV ECHO MEAS - SI(MOD-SP2): 23.3 ML/M^2
BH CV ECHO MEAS - SI(MOD-SP4): 21.6 ML/M^2
BH CV ECHO MEAS - SI(TEICH): 25.8 ML/M^2
BH CV ECHO MEAS - SV(AO): 362.3 ML
BH CV ECHO MEAS - SV(CUBED): 76.7 ML
BH CV ECHO MEAS - SV(LVOT): 67.2 ML
BH CV ECHO MEAS - SV(MOD-SP2): 56 ML
BH CV ECHO MEAS - SV(MOD-SP4): 52 ML
BH CV ECHO MEAS - SV(RVOT): 68.9 ML
BH CV ECHO MEAS - SV(TEICH): 61.9 ML
BH CV ECHO MEAS - TAPSE (>1.6): 3.2 CM2
BH CV ECHO MEASUREMENTS AVERAGE E/E' RATIO: 8.19
BH CV XLRA - RV BASE: 3.3 CM
BH CV XLRA - TDI S': 12 CM/SEC
LEFT ATRIUM VOLUME INDEX: 29 ML/M2
LV EF 2D ECHO EST: 58 %
SINUS: 3.9 CM
STJ: 3.6 CM

## 2018-06-25 PROCEDURE — 93306 TTE W/DOPPLER COMPLETE: CPT | Performed by: INTERNAL MEDICINE

## 2018-06-25 PROCEDURE — 93306 TTE W/DOPPLER COMPLETE: CPT

## 2018-07-25 ENCOUNTER — OFFICE VISIT (OUTPATIENT)
Dept: CARDIOLOGY | Facility: CLINIC | Age: 68
End: 2018-07-25

## 2018-07-25 VITALS
SYSTOLIC BLOOD PRESSURE: 158 MMHG | BODY MASS INDEX: 44.22 KG/M2 | HEIGHT: 68 IN | DIASTOLIC BLOOD PRESSURE: 86 MMHG | WEIGHT: 291.8 LBS | HEART RATE: 81 BPM

## 2018-07-25 DIAGNOSIS — I10 BENIGN ESSENTIAL HYPERTENSION: ICD-10-CM

## 2018-07-25 DIAGNOSIS — I48.91 ATRIAL FIBRILLATION, NEW ONSET (HCC): Primary | ICD-10-CM

## 2018-07-25 PROCEDURE — 99214 OFFICE O/P EST MOD 30 MIN: CPT | Performed by: INTERNAL MEDICINE

## 2018-07-25 PROCEDURE — 93000 ELECTROCARDIOGRAM COMPLETE: CPT | Performed by: INTERNAL MEDICINE

## 2018-07-25 RX ORDER — METOPROLOL SUCCINATE 50 MG/1
50 TABLET, EXTENDED RELEASE ORAL DAILY
Qty: 90 TABLET | Refills: 3 | Status: SHIPPED | OUTPATIENT
Start: 2018-07-25 | End: 2019-07-25 | Stop reason: SDUPTHER

## 2018-07-25 NOTE — PROGRESS NOTES
Date of Office Visit: 2018  Encounter Provider: Az Moreno MD  Place of Service: [facility] CARDIOLOGY  Patient Name: Ganesh Viera  :1950      Chief Complaint   Patient presents with   • Paroxysmal atrial fibrillation     1 month follow up     History of Present Illness  The patient is a 67-year-old white male that was recently seen with new onset atrial fibrillation.  He returns to the office today after starting on Xarelto and also having an echocardiogram.  The patient is now back in sinus rhythm.  He cannot tell when he was out of rhythm.  He does not complain of any chest pain, shortness of breath, lightheadedness, dizziness, orthopnea nor paroxysmal nocturnal dyspnea.    Past Medical History:   Diagnosis Date   • Benign essential hypertension    • CHF (congestive heart failure) (CMS/HCC)    • Eye exam, routine    • H/O Leukocytosis    • Osteoarthritis    • Peripheral edema    • Psoriasis          History reviewed. No pertinent surgical history.        Current Outpatient Prescriptions:   •  Acetaminophen (TYLENOL ARTHRITIS PAIN PO), Take  by mouth 2 (Two) Times a Day., Disp: , Rfl:   •  calcipotriene (DOVONEX) 0.005 % cream, Apply  topically Daily., Disp: 120 g, Rfl: 5  •  clobetasol (TEMOVATE) 0.05 % cream, Apply  topically Daily., Disp: 60 g, Rfl: 5  •  fluticasone (CUTIVATE) 0.05 % cream, Apply  topically Daily., Disp: 60 g, Rfl: 5  •  furosemide (LASIX) 40 MG tablet, Take 1 tablet by mouth Daily., Disp: 30 tablet, Rfl: 5  •  lisinopril-hydrochlorothiazide (PRINZIDE,ZESTORETIC) 20-12.5 MG per tablet, Take 2 tablets by mouth Daily., Disp: 60 tablet, Rfl: 5  •  metoprolol succinate XL (TOPROL-XL) 50 MG 24 hr tablet, Take 1 tablet by mouth Daily., Disp: 90 tablet, Rfl: 3  •  Multiple Vitamins-Minerals (MULTIVITAMIN ADULT PO), Take  by mouth., Disp: , Rfl:   •  nisoldipine (SULAR) 34 MG 24 hr tablet, Take 1 tablet by mouth Daily., Disp: 30 tablet, Rfl:  "5  •  rivaroxaban (XARELTO) 20 MG tablet, Take 1 tablet by mouth Daily., Disp: 30 tablet, Rfl: 3  •  Rivaroxaban 15 & 20 MG tablet therapy pack, Take as directed, Disp: 1 each, Rfl: 0  •  Thiamine HCl (VITAMIN B-1) 50 MG tablet, Take 50 mg by mouth daily., Disp: , Rfl:   •  VOLTAREN 1 % gel gel, apply 4 grams to affected area four times a day, Disp: 500 g, Rfl: 0      Social History     Social History   • Marital status:      Spouse name: N/A   • Number of children: N/A   • Years of education: High school     Occupational History   • [employer] Retired     Social History Main Topics   • Smoking status: Former Smoker     Packs/day: 1.00     Years: 10.00     Types: Cigarettes   • Smokeless tobacco: Never Used      Comment: Caffeine use   • Alcohol use Yes      Comment: occ   • Drug use: No   • Sexual activity: Not on file     Other Topics Concern   • Not on file     Social History Narrative   • No narrative on file         Review of Systems   Constitution: Negative.   HENT: Negative.    Eyes: Negative.    Cardiovascular: Negative.    Respiratory: Negative.    Endocrine: Negative.    Skin: Negative.    Musculoskeletal: Negative.    Gastrointestinal: Negative.    Neurological: Negative.    Psychiatric/Behavioral: Negative.        Procedures      ECG 12 Lead  Date/Time: 7/25/2018 12:58 PM  Performed by: NOAH DOWLING  Authorized by: NOAH DOWLING   Comparison: compared with previous ECG from 6/20/2018  Rhythm: sinus rhythm  Rate: normal  Conduction: conduction normal  ST Segments: ST segments normal  QRS axis: left                  Objective:    /86 (BP Location: Left arm)   Pulse 81   Ht 172.7 cm (68\")   Wt 132 kg (291 lb 12.8 oz)   BMI 44.37 kg/m²         Physical Exam   Constitutional: He is oriented to person, place, and time. He appears well-developed and well-nourished.   Obese   HENT:   Head: Normocephalic.   Eyes: Pupils are equal, round, and reactive to light.   Neck: Normal range of " motion. No JVD present. Carotid bruit is not present. No thyromegaly present.   Cardiovascular: Normal rate, regular rhythm, S1 normal, S2 normal, normal heart sounds and intact distal pulses.  Exam reveals no gallop and no friction rub.    No murmur heard.  Pulmonary/Chest: Effort normal and breath sounds normal.   Abdominal: Soft. Bowel sounds are normal.   Musculoskeletal: He exhibits no edema.   Neurological: He is alert and oriented to person, place, and time.   Skin: Skin is warm, dry and intact. No erythema.   Psychiatric: He has a normal mood and affect.   Vitals reviewed.          Assessment:       Diagnosis Plan   1. Atrial fibrillation, new onset (CMS/HCC)     2. Benign essential hypertension       1. Atrial Fibrillation and Atrial Flutter  Assessment  • The patient has paroxysmal atrial fibrillation  • The patient's CHADS2-VASc score is 2  • A [id number] score of 2 or more is considered a high risk for a thromboembolic event  2.  Hypertension       Plan:    I am going to increase his metoprolol to 50 mg daily in the hopes of keeping him in sinus rhythm and also for a little tighter blood pressure control.  He will come back and see me in September.  I will have him wear 14 day event monitor at that time.  If he remains in sinus rhythm we'll stop the Xarelto.

## 2018-07-26 DIAGNOSIS — G89.29 CHRONIC PAIN OF RIGHT KNEE: ICD-10-CM

## 2018-07-26 DIAGNOSIS — M25.561 CHRONIC PAIN OF RIGHT KNEE: ICD-10-CM

## 2018-09-04 DIAGNOSIS — G89.29 CHRONIC PAIN OF RIGHT KNEE: ICD-10-CM

## 2018-09-04 DIAGNOSIS — M25.561 CHRONIC PAIN OF RIGHT KNEE: ICD-10-CM

## 2018-10-03 ENCOUNTER — OFFICE VISIT (OUTPATIENT)
Dept: CARDIOLOGY | Facility: CLINIC | Age: 68
End: 2018-10-03

## 2018-10-03 VITALS
DIASTOLIC BLOOD PRESSURE: 78 MMHG | HEART RATE: 82 BPM | SYSTOLIC BLOOD PRESSURE: 114 MMHG | BODY MASS INDEX: 44.56 KG/M2 | HEIGHT: 68 IN | WEIGHT: 294 LBS

## 2018-10-03 DIAGNOSIS — I10 ESSENTIAL HYPERTENSION: ICD-10-CM

## 2018-10-03 DIAGNOSIS — I48.91 ATRIAL FIBRILLATION, NEW ONSET (HCC): Primary | ICD-10-CM

## 2018-10-03 PROCEDURE — 93000 ELECTROCARDIOGRAM COMPLETE: CPT | Performed by: INTERNAL MEDICINE

## 2018-10-03 PROCEDURE — 99214 OFFICE O/P EST MOD 30 MIN: CPT | Performed by: INTERNAL MEDICINE

## 2018-10-03 NOTE — PROGRESS NOTES
Date of Office Visit: 10/03/2018  Encounter Provider: Az Moreno MD  Place of Service: [facility] CARDIOLOGY  Patient Name: Ganesh Viera  :1950      Chief Complaint   Patient presents with   • Atrial Fibrillation     History of Present Illness    The patient is a 68-year-old white male who returns to the office today for follow-up.  He was originally seen for atrial fibrillation and started on Xarelto as well as an increase in his beta blockade.  He has converted to sinus rhythm and it appears that he's remained this way now for at least 2 months.  Symptomatically he feels fine without any complaints of palpitations.  He denies any fatigue issues chest pain or peripheral edema of significance.  An echocardiogram was performed which shows that his left ventricular systolic function is normal there is grade 1 diastolic dysfunction noted.  He also reports that his blood pressure has been under better control with the adjustment in medication.    Past Medical History:   Diagnosis Date   • Benign essential hypertension    • CHF (congestive heart failure) (CMS/HCC)    • Eye exam, routine    • H/O Leukocytosis    • Osteoarthritis    • Peripheral edema    • Psoriasis          No past surgical history on file.        Current Outpatient Prescriptions:   •  Acetaminophen (TYLENOL ARTHRITIS PAIN PO), Take  by mouth 2 (Two) Times a Day., Disp: , Rfl:   •  calcipotriene (DOVONEX) 0.005 % cream, Apply  topically Daily., Disp: 120 g, Rfl: 5  •  clobetasol (TEMOVATE) 0.05 % cream, Apply  topically Daily., Disp: 60 g, Rfl: 5  •  fluticasone (CUTIVATE) 0.05 % cream, Apply  topically Daily., Disp: 60 g, Rfl: 5  •  furosemide (LASIX) 40 MG tablet, Take 1 tablet by mouth Daily., Disp: 30 tablet, Rfl: 5  •  lisinopril-hydrochlorothiazide (PRINZIDE,ZESTORETIC) 20-12.5 MG per tablet, Take 2 tablets by mouth Daily., Disp: 60 tablet, Rfl: 5  •  metoprolol succinate XL (TOPROL-XL) 50 MG 24  "hr tablet, Take 1 tablet by mouth Daily., Disp: 90 tablet, Rfl: 3  •  Multiple Vitamins-Minerals (MULTIVITAMIN ADULT PO), Take  by mouth., Disp: , Rfl:   •  nisoldipine (SULAR) 34 MG 24 hr tablet, Take 1 tablet by mouth Daily., Disp: 30 tablet, Rfl: 5  •  rivaroxaban (XARELTO) 20 MG tablet, Take 1 tablet by mouth Daily., Disp: 30 tablet, Rfl: 3  •  Thiamine HCl (VITAMIN B-1) 50 MG tablet, Take 50 mg by mouth daily., Disp: , Rfl:   •  VOLTAREN 1 % gel gel, apply 4 grams to affected area four times a day, Disp: 500 g, Rfl: 0      Social History     Social History   • Marital status:      Spouse name: N/A   • Number of children: N/A   • Years of education: High school     Occupational History   • [employer] Retired     Social History Main Topics   • Smoking status: Former Smoker     Packs/day: 1.00     Years: 10.00     Types: Cigarettes   • Smokeless tobacco: Never Used      Comment: Caffeine use   • Alcohol use Yes      Comment: occ   • Drug use: No   • Sexual activity: Not on file     Other Topics Concern   • Not on file     Social History Narrative   • No narrative on file         Review of Systems   Constitution: Negative.   HENT: Negative.    Eyes: Negative.    Cardiovascular: Negative.    Respiratory: Negative.    Endocrine: Negative.    Skin: Negative.    Musculoskeletal: Negative.    Gastrointestinal: Negative.    Neurological: Negative.    Psychiatric/Behavioral: Negative.        Procedures      ECG 12 Lead  Date/Time: 10/3/2018 2:05 PM  Performed by: NOAH DOWLING  Authorized by: NOAH DOWLING   Comparison: compared with previous ECG from 7/25/2018  Similar to previous ECG  Rhythm: sinus rhythm  Rate: normal  Conduction: 1st degree  ST Segments: ST segments normal  QRS axis: normal                  Objective:    /78   Pulse 82   Ht 172.7 cm (68\")   Wt 133 kg (294 lb)   BMI 44.70 kg/m²         Physical Exam   Constitutional: He is oriented to person, place, and time. He appears " well-developed and well-nourished.   Obese   HENT:   Head: Normocephalic.   Eyes: Pupils are equal, round, and reactive to light.   Neck: Normal range of motion. No JVD present. Carotid bruit is not present. No thyromegaly present.   Cardiovascular: Normal rate, regular rhythm, S1 normal, S2 normal, normal heart sounds and intact distal pulses.  Exam reveals no gallop and no friction rub.    No murmur heard.  Pulmonary/Chest: Effort normal and breath sounds normal.   Abdominal: Soft. Bowel sounds are normal.   Musculoskeletal: He exhibits no edema.   Neurological: He is alert and oriented to person, place, and time.   Skin: Skin is warm, dry and intact. No erythema.   Psychiatric: He has a normal mood and affect.   Vitals reviewed.          Assessment:       Diagnosis Plan   1. Atrial fibrillation, new onset (CMS/HCC)     2. Essential hypertension         1. Atrial Fibrillation and Atrial Flutter  Assessment  • The patient has paroxysmal atrial fibrillation  • The patient's CHADS2-VASc score is 2  • A [id number] score of 2 or more is considered a high risk for a thromboembolic event  • Aspirin prescribed  And have him stop his Xarelto when he completes his prescription.  He has a prostate biopsy scheduled.  I will have him start aspirin 81 mg after.    2.  Hypertension: Controlled     Plan:       We reviewed signs and symptoms of atrial fibrillation.  He will follow back up in 6 months unless he has recurrent atrial fibrillation   elderly, frail, but non-toxic appearing  See HPI  (+) palpebral conjunctiva pale  LLE - dsg in situ over the hip, DCI with wound vac box  (+) edema to entire LLE, no clear cellulitis, 1+ DP elderly, frail, but non-toxic appearing  See HPI  (+) palpebral conjunctiva pale  (+) light brown stool on rectal, chaperoned by Dr Mirela JIMENEZ - dsderic in situ over the hip, DCI with wound vac box  (+) edema to entire LLE, no clear cellulitis, 1+ DP

## 2018-10-16 DIAGNOSIS — M25.561 CHRONIC PAIN OF RIGHT KNEE: ICD-10-CM

## 2018-10-16 DIAGNOSIS — G89.29 CHRONIC PAIN OF RIGHT KNEE: ICD-10-CM

## 2018-12-01 DIAGNOSIS — I10 BENIGN ESSENTIAL HYPERTENSION: ICD-10-CM

## 2018-12-01 DIAGNOSIS — R60.9 PERIPHERAL EDEMA: ICD-10-CM

## 2018-12-03 RX ORDER — LISINOPRIL AND HYDROCHLOROTHIAZIDE 20; 12.5 MG/1; MG/1
TABLET ORAL
Qty: 60 TABLET | Refills: 5 | OUTPATIENT
Start: 2018-12-03

## 2018-12-03 RX ORDER — NISOLDIPINE 34 MG/1
TABLET, FILM COATED, EXTENDED RELEASE ORAL
Qty: 30 TABLET | Refills: 5 | OUTPATIENT
Start: 2018-12-03

## 2018-12-03 RX ORDER — FUROSEMIDE 40 MG/1
TABLET ORAL
Qty: 30 TABLET | Refills: 5 | OUTPATIENT
Start: 2018-12-03

## 2018-12-10 ENCOUNTER — OFFICE VISIT (OUTPATIENT)
Dept: FAMILY MEDICINE CLINIC | Facility: CLINIC | Age: 68
End: 2018-12-10

## 2018-12-10 VITALS
DIASTOLIC BLOOD PRESSURE: 86 MMHG | HEIGHT: 68 IN | HEART RATE: 106 BPM | SYSTOLIC BLOOD PRESSURE: 136 MMHG | TEMPERATURE: 98 F | OXYGEN SATURATION: 97 % | BODY MASS INDEX: 45.92 KG/M2 | WEIGHT: 303 LBS

## 2018-12-10 DIAGNOSIS — G89.29 CHRONIC PAIN OF RIGHT KNEE: ICD-10-CM

## 2018-12-10 DIAGNOSIS — I10 BENIGN ESSENTIAL HYPERTENSION: Primary | ICD-10-CM

## 2018-12-10 DIAGNOSIS — M25.561 CHRONIC PAIN OF RIGHT KNEE: ICD-10-CM

## 2018-12-10 DIAGNOSIS — Z23 NEED FOR VACCINATION: ICD-10-CM

## 2018-12-10 DIAGNOSIS — R60.9 PERIPHERAL EDEMA: ICD-10-CM

## 2018-12-10 PROCEDURE — 90732 PPSV23 VACC 2 YRS+ SUBQ/IM: CPT | Performed by: NURSE PRACTITIONER

## 2018-12-10 PROCEDURE — G0009 ADMIN PNEUMOCOCCAL VACCINE: HCPCS | Performed by: NURSE PRACTITIONER

## 2018-12-10 PROCEDURE — 99213 OFFICE O/P EST LOW 20 MIN: CPT | Performed by: NURSE PRACTITIONER

## 2018-12-10 PROCEDURE — 90471 IMMUNIZATION ADMIN: CPT | Performed by: NURSE PRACTITIONER

## 2018-12-10 PROCEDURE — 90715 TDAP VACCINE 7 YRS/> IM: CPT | Performed by: NURSE PRACTITIONER

## 2018-12-10 RX ORDER — FUROSEMIDE 40 MG/1
40 TABLET ORAL DAILY
Qty: 90 TABLET | Refills: 1 | Status: SHIPPED | OUTPATIENT
Start: 2018-12-10 | End: 2019-06-02 | Stop reason: SDUPTHER

## 2018-12-10 RX ORDER — LISINOPRIL AND HYDROCHLOROTHIAZIDE 20; 12.5 MG/1; MG/1
2 TABLET ORAL DAILY
Qty: 180 TABLET | Refills: 1 | Status: SHIPPED | OUTPATIENT
Start: 2018-12-10 | End: 2019-06-02 | Stop reason: SDUPTHER

## 2018-12-10 RX ORDER — NISOLDIPINE 34 MG/1
34 TABLET, FILM COATED, EXTENDED RELEASE ORAL DAILY
Qty: 90 TABLET | Refills: 1 | Status: SHIPPED | OUTPATIENT
Start: 2018-12-10 | End: 2019-06-02 | Stop reason: SDUPTHER

## 2018-12-10 NOTE — PROGRESS NOTES
Subjective   Ganesh Viera is a 68 y.o. male.     History of Present Illness   Ganesh Viera 68 y.o. male who presents today for routine follow up check and medication refills.  he has a history of   Patient Active Problem List   Diagnosis   • Benign essential hypertension   • History of CHF (congestive heart failure)   • Post-traumatic osteoarthritis of multiple joints   • Peripheral edema   • Psoriasis   • Leukocytosis   • Atrial fibrillation, new onset (CMS/HCC)   .  Since the last visit, he has overall felt well.  He has Hypertenision and is well controlled on medication and osteoarthritis for which he uses the voltaren gel.  he has been compliant with current medications have reviewed them.  The patient denies medication side effects.    Results for orders placed or performed during the hospital encounter of 06/25/18   Adult Transthoracic Echo Complete W/ Cont if Necessary Per Protocol   Result Value Ref Range    TDI S' 12.00 cm/sec    RV Base 3.30 cm    Sinus 3.90 cm    STJ 3.60 cm    Ascending aorta 4.20 cm    LA Volume Index 29.0 mL/m2    EF(MOD-bp) 58 %    Lat Peak E' Brian 10.0 cm/sec    Med Peak E' Brian 8.00 cm/sec    TAPSE (>1.6) 3.20 cm2    BSA 2.4 m^2    IVSd 1.5 cm    LVIDd 5.0 cm    LVIDs 3.7 cm    LVPWd 1.2 cm    IVS/LVPW 1.2     FS 26.6 %    EDV(Teich) 119.6 ml    ESV(Teich) 57.7 ml    EF(Teich) 51.8 %    EF(cubed) 60.5 %    LV mass(C)d 281.4 grams    LV mass(C)dI 117.1 grams/m^2    SV(Teich) 61.9 ml    SI(Teich) 25.8 ml/m^2    SV(cubed) 76.7 ml    SI(cubed) 31.9 ml/m^2    Ao root diam 3.7 cm    Ao root area 11.0 cm^2    ACS 2.2 cm    asc Aorta Diam 4.2 cm    LVOT diam 2.1 cm    LVOT area 3.6 cm^2    LVOT area(traced) 3.5 cm^2    RVOT diam 2.6 cm    RVOT area 5.2 cm^2    LVLd ap4 8.6 cm    EDV(MOD-sp4) 91.0 ml    LVLs ap4 6.9 cm    ESV(MOD-sp4) 39.0 ml    EF(MOD-sp4) 57.1 %    LVLd ap2 8.2 cm    EDV(MOD-sp2) 96.0 ml    LVLs ap2 7.1 cm    ESV(MOD-sp2) 40.0 ml    EF(MOD-sp2) 58.3 %    SV(MOD-sp4)  52.0 ml    SI(MOD-sp4) 21.6 ml/m^2    SV(MOD-sp2) 56.0 ml    SI(MOD-sp2) 23.3 ml/m^2    Ao root area (BSA corrected) 1.6     EF - Contrast (2Ch) 58.3 ml/m^2    EF - Contrast (4Ch) 57.1 ml/m^2    LV Scott Vol (BSA corrected) 37.9 ml/m^2    LV Sys Vol (BSA corrected) 16.2 ml/m^2    MV A dur 0.12 sec    MV E max brian 73.7 cm/sec    MV A max brian 108.2 cm/sec    MV E/A 0.68     MV V2 max 112.7 cm/sec    MV max PG 5.1 mmHg    MV V2 mean 64.7 cm/sec    MV mean PG 2.0 mmHg    MV V2 VTI 26.0 cm    MVA(VTI) 2.6 cm^2    MV P1/2t max brian 66.5 cm/sec    MV P1/2t 65.5 msec    MVA(P1/2t) 3.4 cm^2    MV dec slope 297.0 cm/sec^2    MV dec time 0.28 sec    Ao pk brian 168.3 cm/sec    Ao max PG 11.3 mmHg    Ao max PG (full) 7.7 mmHg    Ao V2 mean 101.8 cm/sec    Ao mean PG 5.2 mmHg    Ao mean PG (full) 3.1 mmHg    Ao V2 VTI 33.0 cm    LORENZO(I,A) 2.0 cm^2    LORENZO(I,D) 2.0 cm^2    LORENZO(V,A) 2.0 cm^2    LORENZO(V,D) 2.0 cm^2    LV V1 max PG 3.7 mmHg    LV V1 mean PG 2.1 mmHg    LV V1 max 95.6 cm/sec    LV V1 mean 67.2 cm/sec    LV V1 VTI 18.7 cm    SV(Ao) 362.3 ml    SI(Ao) 150.7 ml/m^2    SV(LVOT) 67.2 ml    SV(RVOT) 68.9 ml    SI(LVOT) 28.0 ml/m^2    PA V2 max 114.0 cm/sec    PA max PG 5.2 mmHg    PA max PG (full) 3.4 mmHg    BH CV ECHO KRISTY - PVA(V,A) 3.0 cm^2    BH CV ECHO KRISTY - PVA(V,D) 3.0 cm^2    PA acc time 0.13 sec    RV V1 max PG 1.8 mmHg    RV V1 mean PG 0.99 mmHg    RV V1 max 67.4 cm/sec    RV V1 mean 44.7 cm/sec    RV V1 VTI 13.4 cm    PA pr(Accel) 18.8 mmHg    Pulm Sys Brian 48.2 cm/sec    Pulm Scott Brian 34.3 cm/sec    Pulm S/D 1.4     Qp/Qs 1.0     MVA P1/2T LCG 3.3 cm^2     CV ECHO KRISTY - BZI_BMI 44.6 kilograms/m^2     CV ECHO KRISTY - BSA(HAYCOCK) 2.6 m^2     CV ECHO KRISTY - BZI_METRIC_WEIGHT 132.9 kg     CV ECHO KRISTY - BZI_METRIC_HEIGHT 172.7 cm    Avg E/e' ratio 8.19     Echo EF Estimated 58 %       The following portions of the patient's history were reviewed and updated as appropriate: allergies, current medications, past  family history, past medical history, past social history, past surgical history and problem list.    Review of Systems   Constitutional: Negative for fatigue.   Respiratory: Negative for cough and shortness of breath.    Cardiovascular: Negative for chest pain and palpitations.   Skin: Negative for rash.   Psychiatric/Behavioral: Negative for dysphoric mood and sleep disturbance. The patient is not nervous/anxious.        Objective   Physical Exam   Constitutional: He is oriented to person, place, and time. He appears well-developed and well-nourished.   Neck: Carotid bruit is not present.   Cardiovascular: Normal rate and regular rhythm.   Pulmonary/Chest: Effort normal and breath sounds normal.   Neurological: He is oriented to person, place, and time.   Skin: Skin is warm and dry.   Psychiatric: He has a normal mood and affect. His behavior is normal. Judgment and thought content normal.   Nursing note and vitals reviewed.      Assessment/Plan   Ganesh was seen today for hypertension.    Diagnoses and all orders for this visit:    Benign essential hypertension  -     nisoldipine (SULAR) 34 MG 24 hr tablet; Take 1 tablet by mouth Daily.  -     lisinopril-hydrochlorothiazide (PRINZIDE,ZESTORETIC) 20-12.5 MG per tablet; Take 2 tablets by mouth Daily.  -     Comprehensive metabolic panel  -     Lipid panel  -     CBC and Differential  -     TSH    Chronic pain of right knee  -     diclofenac (VOLTAREN) 1 % gel gel; Apply  topically to the appropriate area as directed 4 (Four) Times a Day.    Peripheral edema  -     furosemide (LASIX) 40 MG tablet; Take 1 tablet by mouth Daily.    Need for vaccination  -     Pneumococcal Polysaccharide Vaccine 23-Valent (PPSV23) Greater Than or Equal To 3yo Subcutaneous / IM  -     Tdap Vaccine Greater Than or Equal To 8yo IM

## 2019-03-28 PROBLEM — E66.01 MORBID OBESITY WITH BMI OF 45.0-49.9, ADULT: Status: ACTIVE | Noted: 2019-03-28

## 2019-04-03 ENCOUNTER — OFFICE VISIT (OUTPATIENT)
Dept: CARDIOLOGY | Facility: CLINIC | Age: 69
End: 2019-04-03

## 2019-04-03 VITALS
HEART RATE: 81 BPM | SYSTOLIC BLOOD PRESSURE: 120 MMHG | BODY MASS INDEX: 46.44 KG/M2 | HEIGHT: 68 IN | DIASTOLIC BLOOD PRESSURE: 80 MMHG | WEIGHT: 306.4 LBS

## 2019-04-03 DIAGNOSIS — I10 BENIGN ESSENTIAL HYPERTENSION: ICD-10-CM

## 2019-04-03 DIAGNOSIS — E66.01 MORBID OBESITY WITH BMI OF 45.0-49.9, ADULT (HCC): ICD-10-CM

## 2019-04-03 DIAGNOSIS — I48.91 ATRIAL FIBRILLATION, NEW ONSET (HCC): Primary | ICD-10-CM

## 2019-04-03 PROCEDURE — 93000 ELECTROCARDIOGRAM COMPLETE: CPT | Performed by: NURSE PRACTITIONER

## 2019-04-03 PROCEDURE — 99214 OFFICE O/P EST MOD 30 MIN: CPT | Performed by: NURSE PRACTITIONER

## 2019-04-09 ENCOUNTER — TELEPHONE (OUTPATIENT)
Dept: CARDIOLOGY | Facility: CLINIC | Age: 69
End: 2019-04-09

## 2019-04-09 NOTE — TELEPHONE ENCOUNTER
Patient was notified of Holter results.  He is having about 6% atrial fibrillation burden going back and forth between A. fib and sinus rhythm which no reported sustained RVR episodes of a-fib.  He is asymptomatic with it and is tolerating the Xarelto fine.  I think we will just continue to monitor him and he will contact me with any further symptoms.  I think is appropriate that he can cancel his 1 month follow-up with me and contact us with any issues.  He will see Dr. Moreno again in July 2019.    Can you please cancel is 5/3/19 appt with froy Aguilar?

## 2019-06-02 DIAGNOSIS — R60.9 PERIPHERAL EDEMA: ICD-10-CM

## 2019-06-02 DIAGNOSIS — I10 BENIGN ESSENTIAL HYPERTENSION: ICD-10-CM

## 2019-06-03 RX ORDER — NISOLDIPINE 34 MG/1
TABLET, FILM COATED, EXTENDED RELEASE ORAL
Qty: 30 TABLET | Refills: 0 | Status: SHIPPED | OUTPATIENT
Start: 2019-06-03 | End: 2019-07-11 | Stop reason: SDUPTHER

## 2019-06-03 RX ORDER — LISINOPRIL AND HYDROCHLOROTHIAZIDE 20; 12.5 MG/1; MG/1
2 TABLET ORAL DAILY
Qty: 60 TABLET | Refills: 0 | Status: SHIPPED | OUTPATIENT
Start: 2019-06-03 | End: 2019-07-11 | Stop reason: SDUPTHER

## 2019-06-03 RX ORDER — FUROSEMIDE 40 MG/1
TABLET ORAL
Qty: 30 TABLET | Refills: 0 | Status: SHIPPED | OUTPATIENT
Start: 2019-06-03 | End: 2019-07-11 | Stop reason: SDUPTHER

## 2019-07-03 ENCOUNTER — OFFICE VISIT (OUTPATIENT)
Dept: CARDIOLOGY | Facility: CLINIC | Age: 69
End: 2019-07-03

## 2019-07-03 VITALS
BODY MASS INDEX: 46.53 KG/M2 | DIASTOLIC BLOOD PRESSURE: 72 MMHG | SYSTOLIC BLOOD PRESSURE: 114 MMHG | OXYGEN SATURATION: 96 % | HEIGHT: 68 IN | HEART RATE: 103 BPM | WEIGHT: 307 LBS

## 2019-07-03 DIAGNOSIS — L40.9 PSORIASIS: ICD-10-CM

## 2019-07-03 DIAGNOSIS — I48.0 PAROXYSMAL ATRIAL FIBRILLATION (HCC): Primary | ICD-10-CM

## 2019-07-03 DIAGNOSIS — E66.01 MORBID OBESITY WITH BMI OF 45.0-49.9, ADULT (HCC): ICD-10-CM

## 2019-07-03 DIAGNOSIS — I10 BENIGN ESSENTIAL HYPERTENSION: ICD-10-CM

## 2019-07-03 PROCEDURE — 93000 ELECTROCARDIOGRAM COMPLETE: CPT | Performed by: INTERNAL MEDICINE

## 2019-07-03 PROCEDURE — 99214 OFFICE O/P EST MOD 30 MIN: CPT | Performed by: INTERNAL MEDICINE

## 2019-07-03 RX ORDER — CLOBETASOL PROPIONATE 0.5 MG/G
CREAM TOPICAL
Qty: 60 G | Refills: 0 | Status: SHIPPED | OUTPATIENT
Start: 2019-07-03 | End: 2019-07-11 | Stop reason: SDUPTHER

## 2019-07-03 NOTE — PROGRESS NOTES
Date of Office Visit: 2019  Encounter Provider: Az Moreno MD  Place of Service: [facility] CARDIOLOGY  Patient Name: Ganesh Viera  :1950      Chief Complaint   Patient presents with   • Atrial Fibrillation     8 mos follow up     History of Present Illness  The patient is a 68-year-old white male with a history of paroxysmal atrial fibrillation.  He was seen by SHAKILA Davis back in April.  At that time he was back in fibrillation.  The patient was restarted on Xarelto.  He returns today feeling well.  He is back in sinus rhythm.  He does not appear to be having any difficulties associated with his rhythm or with the Xarelto.  He also has a history of hypertension for which she is been on multiple medications and doing reasonably well.  His blood pressure is stable.  He is obese.  He has not done much issue.    Past Medical History:   Diagnosis Date   • Benign essential hypertension    • CHF (congestive heart failure) (CMS/HCC)    • Eye exam, routine    • H/O Leukocytosis    • New onset atrial fibrillation (CMS/HCC) 10/03/2018   • Osteoarthritis    • Peripheral edema    • Psoriasis          History reviewed. No pertinent surgical history.        Current Outpatient Medications:   •  Acetaminophen (TYLENOL ARTHRITIS PAIN PO), Take  by mouth 2 (Two) Times a Day., Disp: , Rfl:   •  calcipotriene (DOVONEX) 0.005 % cream, Apply  topically Daily., Disp: 120 g, Rfl: 5  •  clobetasol (TEMOVATE) 0.05 % cream, apply to affected area once daily, Disp: 60 g, Rfl: 0  •  diclofenac (VOLTAREN) 1 % gel gel, Apply  topically to the appropriate area as directed 4 (Four) Times a Day., Disp: 500 g, Rfl: 5  •  fluticasone (CUTIVATE) 0.05 % cream, Apply  topically Daily., Disp: 60 g, Rfl: 5  •  furosemide (LASIX) 40 MG tablet, take 1 tablet by mouth once daily, Disp: 30 tablet, Rfl: 0  •  lisinopril-hydrochlorothiazide (PRINZIDE,ZESTORETIC) 20-12.5 MG per tablet, take 2  "tablets by mouth daily, Disp: 60 tablet, Rfl: 0  •  metoprolol succinate XL (TOPROL-XL) 50 MG 24 hr tablet, Take 1 tablet by mouth Daily., Disp: 90 tablet, Rfl: 3  •  Multiple Vitamins-Minerals (MULTIVITAMIN ADULT PO), Take  by mouth Daily., Disp: , Rfl:   •  nisoldipine (SULAR) 34 MG 24 hr tablet, take 1 tablet by mouth once daily, Disp: 30 tablet, Rfl: 0  •  rivaroxaban (XARELTO) 20 MG tablet, Take 1 tablet by mouth Daily., Disp: 30 tablet, Rfl: 3  •  Thiamine HCl (VITAMIN B-1) 50 MG tablet, Take 50 mg by mouth daily., Disp: , Rfl:       Social History     Socioeconomic History   • Marital status:      Spouse name: Not on file   • Number of children: Not on file   • Years of education: High school   • Highest education level: Not on file   Occupational History   • Occupation: [employer]     Employer: RETIRED   Tobacco Use   • Smoking status: Former Smoker     Packs/day: 1.00     Years: 10.00     Pack years: 10.00     Types: Cigarettes   • Smokeless tobacco: Never Used   • Tobacco comment: Caffeine use   Substance and Sexual Activity   • Alcohol use: Yes     Comment: occ   • Drug use: No         Review of Systems   Constitution: Negative.   HENT: Negative.    Eyes: Negative.    Cardiovascular: Positive for leg swelling.   Respiratory: Negative.    Endocrine: Negative.    Skin: Negative.    Musculoskeletal: Negative.    Gastrointestinal: Negative.    Neurological: Negative.    Psychiatric/Behavioral: Negative.        Procedures      ECG 12 Lead  Date/Time: 7/3/2019 3:20 PM  Performed by: Az Moreno MD  Authorized by: Az Moreno MD   Comparison: compared with previous ECG from 4/3/2019  Comparison to previous ECG: Atrial fibrillation has resolved  Rhythm: sinus rhythm  Rate: normal  Q waves: V1 and V2    QRS axis: left                  Objective:    /72 (BP Location: Left arm, Patient Position: Sitting, Cuff Size: Large Adult)   Pulse 103   Ht 172.7 cm (68\")   Wt (!) 139 kg (307 lb)   " SpO2 96%   BMI 46.68 kg/m²         Physical Exam   Constitutional: He is oriented to person, place, and time. He appears well-developed and well-nourished.   Obese   HENT:   Head: Normocephalic.   Eyes: Pupils are equal, round, and reactive to light.   Neck: Normal range of motion. No JVD present. Carotid bruit is not present. No thyromegaly present.   Cardiovascular: Normal rate, regular rhythm, S1 normal, S2 normal, normal heart sounds and intact distal pulses. Exam reveals no gallop and no friction rub.   No murmur heard.  Pulmonary/Chest: Effort normal and breath sounds normal.   Abdominal: Soft. Bowel sounds are normal.   Musculoskeletal: He exhibits no edema.   Neurological: He is alert and oriented to person, place, and time.   Skin: Skin is warm, dry and intact. No erythema.   Psychiatric: He has a normal mood and affect.   Vitals reviewed.          Assessment:       Diagnosis Plan   1. Paroxysmal atrial fibrillation (CMS/[facility])     2. Benign essential hypertension     3. Morbid obesity with BMI of 45.0-49.9, adult (CMS/HCC)       1. Atrial Fibrillation and Atrial Flutter  Assessment  • The patient has paroxysmal atrial fibrillation  • The patient's CHADS2-VASc score is 2  • A [id number] score of 2 or more is considered a high risk for a thromboembolic event  • Rivaroxaban prescribed  Continue present medications    2.  Hypertension: Controlled    3.  Morbid obesity: Diet and healthy measures emphasized.       Plan:

## 2019-07-11 ENCOUNTER — OFFICE VISIT (OUTPATIENT)
Dept: FAMILY MEDICINE CLINIC | Facility: CLINIC | Age: 69
End: 2019-07-11

## 2019-07-11 VITALS
HEIGHT: 68 IN | WEIGHT: 308 LBS | SYSTOLIC BLOOD PRESSURE: 118 MMHG | RESPIRATION RATE: 20 BRPM | DIASTOLIC BLOOD PRESSURE: 70 MMHG | OXYGEN SATURATION: 96 % | HEART RATE: 95 BPM | BODY MASS INDEX: 46.68 KG/M2

## 2019-07-11 DIAGNOSIS — L40.50 PSORIATIC ARTHRITIS (HCC): ICD-10-CM

## 2019-07-11 DIAGNOSIS — I10 BENIGN ESSENTIAL HYPERTENSION: Primary | ICD-10-CM

## 2019-07-11 DIAGNOSIS — R60.9 PERIPHERAL EDEMA: ICD-10-CM

## 2019-07-11 DIAGNOSIS — L40.9 PSORIASIS: ICD-10-CM

## 2019-07-11 DIAGNOSIS — Z12.5 ENCOUNTER FOR SCREENING FOR MALIGNANT NEOPLASM OF PROSTATE: ICD-10-CM

## 2019-07-11 PROCEDURE — 99214 OFFICE O/P EST MOD 30 MIN: CPT | Performed by: NURSE PRACTITIONER

## 2019-07-11 RX ORDER — CALCIPOTRIENE 50 UG/G
CREAM TOPICAL
Qty: 120 G | Refills: 5 | Status: SHIPPED | OUTPATIENT
Start: 2019-07-11 | End: 2020-02-03 | Stop reason: SDUPTHER

## 2019-07-11 RX ORDER — CLOBETASOL PROPIONATE 0.5 MG/G
CREAM TOPICAL DAILY
Qty: 60 G | Refills: 5 | Status: SHIPPED | OUTPATIENT
Start: 2019-07-11 | End: 2020-02-03 | Stop reason: SDUPTHER

## 2019-07-11 RX ORDER — LISINOPRIL AND HYDROCHLOROTHIAZIDE 20; 12.5 MG/1; MG/1
2 TABLET ORAL DAILY
Qty: 60 TABLET | Refills: 5 | Status: SHIPPED | OUTPATIENT
Start: 2019-07-11 | End: 2020-01-02

## 2019-07-11 RX ORDER — FLUTICASONE PROPIONATE 0.05 %
CREAM (GRAM) TOPICAL DAILY
Qty: 60 G | Refills: 5 | Status: SHIPPED | OUTPATIENT
Start: 2019-07-11 | End: 2020-02-03 | Stop reason: SDUPTHER

## 2019-07-11 RX ORDER — NISOLDIPINE 34 MG/1
34 TABLET, FILM COATED, EXTENDED RELEASE ORAL DAILY
Qty: 30 TABLET | Refills: 5 | Status: SHIPPED | OUTPATIENT
Start: 2019-07-11 | End: 2020-01-02

## 2019-07-11 RX ORDER — FUROSEMIDE 40 MG/1
40 TABLET ORAL DAILY
Qty: 30 TABLET | Refills: 5 | Status: SHIPPED | OUTPATIENT
Start: 2019-07-11 | End: 2020-01-02

## 2019-07-11 NOTE — PROGRESS NOTES
Subjective   Ganesh Viera is a 68 y.o. male.     History of Present Illness   Ganesh Viera 68 y.o. male who presents today for routine follow up check and medication refills.  he has a history of   Patient Active Problem List   Diagnosis   • Benign essential hypertension   • History of CHF (congestive heart failure)   • Post-traumatic osteoarthritis of multiple joints   • Peripheral edema   • Psoriasis   • Leukocytosis   • Atrial fibrillation, new onset (CMS/HCC)   • Morbid obesity with BMI of 45.0-49.9, adult (CMS/HCC)   .  Since the last visit, he has overall felt well.  He has Hypertenision and is well controlled on medication and psoriasis which has been well treated with topical steroids .  he has been compliant with current medications have reviewed them.  The patient denies medication side effects.    Results for orders placed or performed during the hospital encounter of 06/25/18   Adult Transthoracic Echo Complete W/ Cont if Necessary Per Protocol   Result Value Ref Range    TDI S' 12.00 cm/sec    RV Base 3.30 cm    Sinus 3.90 cm    STJ 3.60 cm    Ascending aorta 4.20 cm    LA Volume Index 29.0 mL/m2    EF(MOD-bp) 58 %    Lat Peak E' Brian 10.0 cm/sec    Med Peak E' Brian 8.00 cm/sec    TAPSE (>1.6) 3.20 cm2    BSA 2.4 m^2    IVSd 1.5 cm    LVIDd 5.0 cm    LVIDs 3.7 cm    LVPWd 1.2 cm    IVS/LVPW 1.2     FS 26.6 %    EDV(Teich) 119.6 ml    ESV(Teich) 57.7 ml    EF(Teich) 51.8 %    EF(cubed) 60.5 %    LV mass(C)d 281.4 grams    LV mass(C)dI 117.1 grams/m^2    SV(Teich) 61.9 ml    SI(Teich) 25.8 ml/m^2    SV(cubed) 76.7 ml    SI(cubed) 31.9 ml/m^2    Ao root diam 3.7 cm    Ao root area 11.0 cm^2    ACS 2.2 cm    asc Aorta Diam 4.2 cm    LVOT diam 2.1 cm    LVOT area 3.6 cm^2    LVOT area(traced) 3.5 cm^2    RVOT diam 2.6 cm    RVOT area 5.2 cm^2    LVLd ap4 8.6 cm    EDV(MOD-sp4) 91.0 ml    LVLs ap4 6.9 cm    ESV(MOD-sp4) 39.0 ml    EF(MOD-sp4) 57.1 %    LVLd ap2 8.2 cm    EDV(MOD-sp2) 96.0 ml    LVLs ap2  7.1 cm    ESV(MOD-sp2) 40.0 ml    EF(MOD-sp2) 58.3 %    SV(MOD-sp4) 52.0 ml    SI(MOD-sp4) 21.6 ml/m^2    SV(MOD-sp2) 56.0 ml    SI(MOD-sp2) 23.3 ml/m^2    Ao root area (BSA corrected) 1.6     EF - Contrast (2Ch) 58.3 ml/m^2    EF - Contrast (4Ch) 57.1 ml/m^2    LV Scott Vol (BSA corrected) 37.9 ml/m^2    LV Sys Vol (BSA corrected) 16.2 ml/m^2    MV A dur 0.12 sec    MV E max brian 73.7 cm/sec    MV A max brian 108.2 cm/sec    MV E/A 0.68     MV V2 max 112.7 cm/sec    MV max PG 5.1 mmHg    MV V2 mean 64.7 cm/sec    MV mean PG 2.0 mmHg    MV V2 VTI 26.0 cm    MVA(VTI) 2.6 cm^2    MV P1/2t max brian 66.5 cm/sec    MV P1/2t 65.5 msec    MVA(P1/2t) 3.4 cm^2    MV dec slope 297.0 cm/sec^2    MV dec time 0.28 sec    Ao pk brian 168.3 cm/sec    Ao max PG 11.3 mmHg    Ao max PG (full) 7.7 mmHg    Ao V2 mean 101.8 cm/sec    Ao mean PG 5.2 mmHg    Ao mean PG (full) 3.1 mmHg    Ao V2 VTI 33.0 cm    LORENZO(I,A) 2.0 cm^2    LORENZO(I,D) 2.0 cm^2    LORENZO(V,A) 2.0 cm^2    LORENZO(V,D) 2.0 cm^2    LV V1 max PG 3.7 mmHg    LV V1 mean PG 2.1 mmHg    LV V1 max 95.6 cm/sec    LV V1 mean 67.2 cm/sec    LV V1 VTI 18.7 cm    SV(Ao) 362.3 ml    SI(Ao) 150.7 ml/m^2    SV(LVOT) 67.2 ml    SV(RVOT) 68.9 ml    SI(LVOT) 28.0 ml/m^2    PA V2 max 114.0 cm/sec    PA max PG 5.2 mmHg    PA max PG (full) 3.4 mmHg     CV ECHO KRISTY - PVA(V,A) 3.0 cm^2     CV ECHO KRISTY - PVA(V,D) 3.0 cm^2    PA acc time 0.13 sec    RV V1 max PG 1.8 mmHg    RV V1 mean PG 0.99 mmHg    RV V1 max 67.4 cm/sec    RV V1 mean 44.7 cm/sec    RV V1 VTI 13.4 cm    PA pr(Accel) 18.8 mmHg    Pulm Sys Brian 48.2 cm/sec    Pulm Scott Brian 34.3 cm/sec    Pulm S/D 1.4     Qp/Qs 1.0     MVA P1/2T LCG 3.3 cm^2     CV ECHO KRISTY - BZI_BMI 44.6 kilograms/m^2     CV ECHO KRISTY - BSA(HAYCOCK) 2.6 m^2     CV ECHO KRISTY - BZI_METRIC_WEIGHT 132.9 kg     CV ECHO KRISTY - BZI_METRIC_HEIGHT 172.7 cm    Avg E/e' ratio 8.19     Echo EF Estimated 58 %     Reports worsening arthritis to bilateral hands, knees and left  ankle.  States he has had symptoms intermittently for the past year or so but seems to be more frequent and severe.  He will reports swelling to joints at times.  He has not seen rheumatologist for workup of psoriatic arthritis but would like referral.  He does not have joint swelling currently.     The following portions of the patient's history were reviewed and updated as appropriate: allergies, current medications, past family history, past medical history, past social history, past surgical history and problem list.    Review of Systems   Constitutional: Negative for fatigue.   Respiratory: Negative for cough and shortness of breath.    Cardiovascular: Negative for chest pain and palpitations.   Musculoskeletal: Positive for arthralgias and joint swelling.   Skin: Negative for rash.   Neurological: Negative for numbness.   Psychiatric/Behavioral: Negative for dysphoric mood and sleep disturbance. The patient is not nervous/anxious.        Objective   Physical Exam   Constitutional: He is oriented to person, place, and time. He appears well-developed and well-nourished.   Neck: Carotid bruit is not present.   Cardiovascular: Normal rate and regular rhythm.   Pulmonary/Chest: Effort normal and breath sounds normal.   Neurological: He is oriented to person, place, and time.   Skin: Skin is warm and dry.   Psychiatric: He has a normal mood and affect. His behavior is normal. Judgment and thought content normal.   Nursing note and vitals reviewed.      Assessment/Plan   Ganesh was seen today for med refill, hypertension and rheumatology referral.    Diagnoses and all orders for this visit:    Benign essential hypertension  -     nisoldipine (SULAR) 34 MG 24 hr tablet; Take 1 tablet by mouth Daily.  -     lisinopril-hydrochlorothiazide (PRINZIDE,ZESTORETIC) 20-12.5 MG per tablet; Take 2 tablets by mouth Daily.  -     Comprehensive metabolic panel  -     Lipid panel  -     CBC and Differential  -     TSH  -     PSA  Screen    Peripheral edema  -     furosemide (LASIX) 40 MG tablet; Take 1 tablet by mouth Daily.    Encounter for screening for malignant neoplasm of prostate   -     PSA Screen    Psoriasis  -     fluticasone (CUTIVATE) 0.05 % cream; Apply  topically to the appropriate area as directed Daily.  -     clobetasol (TEMOVATE) 0.05 % cream; Apply  topically to the appropriate area as directed Daily.  -     calcipotriene (DOVONEX) 0.005 % cream; Apply  topically to the appropriate area as directed Daily.    Psoriatic arthritis (CMS/[facility])  -     Ambulatory Referral to Rheumatology

## 2019-07-25 RX ORDER — METOPROLOL SUCCINATE 50 MG/1
TABLET, EXTENDED RELEASE ORAL
Qty: 90 TABLET | Refills: 1 | Status: SHIPPED | OUTPATIENT
Start: 2019-07-25 | End: 2020-01-16

## 2019-08-27 RX ORDER — RIVAROXABAN 20 MG/1
TABLET, FILM COATED ORAL
Qty: 30 TABLET | Refills: 3 | Status: SHIPPED | OUTPATIENT
Start: 2019-08-27 | End: 2019-12-30

## 2019-09-20 DIAGNOSIS — G89.29 CHRONIC PAIN OF RIGHT KNEE: ICD-10-CM

## 2019-09-20 DIAGNOSIS — M25.561 CHRONIC PAIN OF RIGHT KNEE: ICD-10-CM

## 2019-12-30 RX ORDER — RIVAROXABAN 20 MG/1
TABLET, FILM COATED ORAL
Qty: 30 TABLET | Refills: 3 | Status: SHIPPED | OUTPATIENT
Start: 2019-12-30 | End: 2020-01-16

## 2020-01-02 ENCOUNTER — TELEPHONE (OUTPATIENT)
Dept: FAMILY MEDICINE CLINIC | Facility: CLINIC | Age: 70
End: 2020-01-02

## 2020-01-02 DIAGNOSIS — I10 BENIGN ESSENTIAL HYPERTENSION: ICD-10-CM

## 2020-01-02 DIAGNOSIS — R60.9 PERIPHERAL EDEMA: ICD-10-CM

## 2020-01-02 RX ORDER — NISOLDIPINE 34 MG/1
TABLET, FILM COATED, EXTENDED RELEASE ORAL
Qty: 30 TABLET | Refills: 0 | Status: SHIPPED | OUTPATIENT
Start: 2020-01-02 | End: 2020-02-03 | Stop reason: SDUPTHER

## 2020-01-02 RX ORDER — LISINOPRIL AND HYDROCHLOROTHIAZIDE 20; 12.5 MG/1; MG/1
2 TABLET ORAL DAILY
Qty: 60 TABLET | Refills: 0 | Status: SHIPPED | OUTPATIENT
Start: 2020-01-02 | End: 2020-02-03 | Stop reason: SDUPTHER

## 2020-01-02 RX ORDER — FUROSEMIDE 40 MG/1
TABLET ORAL
Qty: 30 TABLET | Refills: 0 | Status: SHIPPED | OUTPATIENT
Start: 2020-01-02 | End: 2020-02-03 | Stop reason: SDUPTHER

## 2020-01-16 ENCOUNTER — OFFICE VISIT (OUTPATIENT)
Dept: CARDIOLOGY | Facility: CLINIC | Age: 70
End: 2020-01-16

## 2020-01-16 VITALS
BODY MASS INDEX: 46.56 KG/M2 | WEIGHT: 307.2 LBS | HEIGHT: 68 IN | SYSTOLIC BLOOD PRESSURE: 122 MMHG | OXYGEN SATURATION: 98 % | DIASTOLIC BLOOD PRESSURE: 82 MMHG | HEART RATE: 101 BPM

## 2020-01-16 DIAGNOSIS — E66.01 MORBID OBESITY WITH BMI OF 45.0-49.9, ADULT (HCC): ICD-10-CM

## 2020-01-16 DIAGNOSIS — I48.0 PAROXYSMAL ATRIAL FIBRILLATION (HCC): Primary | ICD-10-CM

## 2020-01-16 PROCEDURE — 93000 ELECTROCARDIOGRAM COMPLETE: CPT | Performed by: INTERNAL MEDICINE

## 2020-01-16 PROCEDURE — 99213 OFFICE O/P EST LOW 20 MIN: CPT | Performed by: INTERNAL MEDICINE

## 2020-01-16 RX ORDER — METOPROLOL SUCCINATE 50 MG/1
TABLET, EXTENDED RELEASE ORAL
Qty: 90 TABLET | Refills: 1 | Status: SHIPPED | OUTPATIENT
Start: 2020-01-16 | End: 2020-01-27

## 2020-01-16 RX ORDER — ALLOPURINOL 100 MG/1
1 TABLET ORAL DAILY
COMMUNITY
Start: 2020-01-04 | End: 2022-09-01 | Stop reason: ALTCHOICE

## 2020-01-16 RX ORDER — COLCHICINE 0.6 MG/1
1 TABLET ORAL DAILY
COMMUNITY
Start: 2020-01-15

## 2020-01-16 NOTE — PROGRESS NOTES
Date of Office Visit: 2020    Patient Name: Ganesh Viera  : 1950    Encounter Provider: Az Moreno MD  Referring Provider: No ref. provider found  Place of Service: [facility] CARDIOLOGY  Patient Care Team:  Ganesh Gomez MD as PCP - General (Family Medicine)  Ganesh Cole MD as Consulting Physician (Hematology and Oncology)      Chief Complaint   Patient presents with   • Atrial Fibrillation     6 mos follow up     History of Present Illness    The patient is a 69-year-old male with a history of hypertension as well as paroxysmal atrial fibrillation who returns to the office today.  In the last 6 months he has not noted any paroxysms of atrial fibrillation.  He states he checks his pulse regularly and has never noted any irregularity.  He generally cannot feel palpitations.  The patient has no complaints of shortness of breath.  He denies orthopnea or paroxysmal nocturnal dyspnea.  His blood pressure he reports has been under good control.  Past Medical History:   Diagnosis Date   • Benign essential hypertension    • CHF (congestive heart failure) (CMS/[facility])    • Eye exam, routine    • H/O Leukocytosis    • New onset atrial fibrillation (CMS/HCC) 10/03/2018   • Osteoarthritis    • Peripheral edema    • Psoriasis          History reviewed. No pertinent surgical history.        Current Outpatient Medications:   •  Acetaminophen (TYLENOL ARTHRITIS PAIN PO), Take  by mouth 2 (Two) Times a Day., Disp: , Rfl:   •  allopurinol (ZYLOPRIM) 100 MG tablet, Take 2 tablets by mouth Daily., Disp: , Rfl:   •  calcipotriene (DOVONEX) 0.005 % cream, Apply  topically to the appropriate area as directed Daily., Disp: 120 g, Rfl: 5  •  clobetasol (TEMOVATE) 0.05 % cream, Apply  topically to the appropriate area as directed Daily., Disp: 60 g, Rfl: 5  •  colchicine 0.6 MG tablet, Take 1 tablet by mouth Daily., Disp: , Rfl:   •  diclofenac (VOLTAREN) 1 % gel  gel, apply to affected area four times a day, Disp: 500 g, Rfl: 5  •  fluticasone (CUTIVATE) 0.05 % cream, Apply  topically to the appropriate area as directed Daily., Disp: 60 g, Rfl: 5  •  furosemide (LASIX) 40 MG tablet, take 1 tablet by mouth once daily, Disp: 30 tablet, Rfl: 0  •  lisinopril-hydrochlorothiazide (PRINZIDE,ZESTORETIC) 20-12.5 MG per tablet, take 2 tablets by mouth daily, Disp: 60 tablet, Rfl: 0  •  metoprolol succinate XL (TOPROL-XL) 50 MG 24 hr tablet, take 1 tablet by mouth once daily, Disp: 90 tablet, Rfl: 1  •  Multiple Vitamins-Minerals (MULTIVITAMIN ADULT PO), Take  by mouth Daily., Disp: , Rfl:   •  nisoldipine (SULAR) 34 MG 24 hr tablet, take 1 tablet by mouth once daily, Disp: 30 tablet, Rfl: 0  •  Thiamine HCl (VITAMIN B-1) 50 MG tablet, Take 50 mg by mouth daily., Disp: , Rfl:       Social History     Socioeconomic History   • Marital status:      Spouse name: Not on file   • Number of children: Not on file   • Years of education: High school   • Highest education level: Not on file   Occupational History   • Occupation: [employer]     Employer: RETIRED   Tobacco Use   • Smoking status: Former Smoker     Packs/day: 1.00     Years: 10.00     Pack years: 10.00     Types: Cigarettes   • Smokeless tobacco: Never Used   • Tobacco comment: Caffeine use   Substance and Sexual Activity   • Alcohol use: Yes     Comment: occ   • Drug use: No         Review of Systems   Constitution: Negative.   HENT: Negative.    Eyes: Negative.    Cardiovascular: Negative.    Respiratory: Negative.    Endocrine: Negative.    Skin: Negative.    Musculoskeletal: Negative.    Gastrointestinal: Negative.    Neurological: Negative.    Psychiatric/Behavioral: Negative.        Procedures      ECG 12 Lead  Date/Time: 1/16/2020 11:38 AM  Performed by: Az Moreno MD  Authorized by: Az Moreno MD   Comparison: compared with previous ECG from 7/3/2019  Similar to previous ECG  Rhythm: sinus  "rhythm  Rate: normal  Conduction: conduction normal  QRS axis: normal                  Objective:    /82 (BP Location: Left arm, Patient Position: Sitting, Cuff Size: Large Adult)   Pulse 101   Ht 172.7 cm (68\")   Wt (!) 139 kg (307 lb 3.2 oz)   SpO2 98%   BMI 46.71 kg/m²         Physical Exam   Constitutional: He is oriented to person, place, and time. He appears well-developed and well-nourished.   Morbidly obese   HENT:   Head: Normocephalic.   Eyes: Pupils are equal, round, and reactive to light.   Neck: Normal range of motion. No JVD present. Carotid bruit is not present. No thyromegaly present.   Cardiovascular: Normal rate, regular rhythm, S1 normal, S2 normal, normal heart sounds and intact distal pulses. Exam reveals no gallop and no friction rub.   No murmur heard.  Pulmonary/Chest: Effort normal and breath sounds normal.   Abdominal: Soft. Bowel sounds are normal.   Musculoskeletal: He exhibits no edema.   Neurological: He is alert and oriented to person, place, and time.   Skin: Skin is warm, dry and intact. No erythema.   Psychiatric: He has a normal mood and affect.   Vitals reviewed.          Assessment:       Diagnosis Plan   1. Paroxysmal atrial fibrillation (CMS/HCC)     2. Morbid obesity with BMI of 45.0-49.9, adult (CMS/HCC)       1.  Paroxysmal atrial fibrillation: Chads 2 Vascor equals 2.  We will stop his Xarelto at this time.  He has not had any recurrence that he is aware of.  Continue to monitor  2.  Hypertension: Controlled  3.  Morbid obesity       Plan:         "

## 2020-01-27 RX ORDER — METOPROLOL SUCCINATE 50 MG/1
TABLET, EXTENDED RELEASE ORAL
Qty: 90 TABLET | Refills: 1 | Status: SHIPPED | OUTPATIENT
Start: 2020-01-27 | End: 2020-10-08 | Stop reason: SDUPTHER

## 2020-02-03 ENCOUNTER — OFFICE VISIT (OUTPATIENT)
Dept: FAMILY MEDICINE CLINIC | Facility: CLINIC | Age: 70
End: 2020-02-03

## 2020-02-03 VITALS
DIASTOLIC BLOOD PRESSURE: 78 MMHG | RESPIRATION RATE: 16 BRPM | HEART RATE: 64 BPM | BODY MASS INDEX: 47.13 KG/M2 | OXYGEN SATURATION: 97 % | SYSTOLIC BLOOD PRESSURE: 122 MMHG | WEIGHT: 311 LBS | HEIGHT: 68 IN

## 2020-02-03 DIAGNOSIS — L40.9 PSORIASIS: ICD-10-CM

## 2020-02-03 DIAGNOSIS — I10 BENIGN ESSENTIAL HYPERTENSION: Primary | ICD-10-CM

## 2020-02-03 DIAGNOSIS — R60.9 PERIPHERAL EDEMA: ICD-10-CM

## 2020-02-03 DIAGNOSIS — M10.9 ACUTE GOUT, UNSPECIFIED CAUSE, UNSPECIFIED SITE: ICD-10-CM

## 2020-02-03 PROCEDURE — 99213 OFFICE O/P EST LOW 20 MIN: CPT | Performed by: NURSE PRACTITIONER

## 2020-02-03 RX ORDER — CLOBETASOL PROPIONATE 0.5 MG/G
CREAM TOPICAL DAILY
Qty: 60 G | Refills: 5 | Status: SHIPPED | OUTPATIENT
Start: 2020-02-03 | End: 2021-08-10 | Stop reason: SDUPTHER

## 2020-02-03 RX ORDER — NISOLDIPINE 34 MG/1
34 TABLET, FILM COATED, EXTENDED RELEASE ORAL DAILY
Qty: 90 TABLET | Refills: 1 | Status: SHIPPED | OUTPATIENT
Start: 2020-02-03 | End: 2020-08-04 | Stop reason: SDUPTHER

## 2020-02-03 RX ORDER — FLUTICASONE PROPIONATE 0.05 %
CREAM (GRAM) TOPICAL DAILY
Qty: 60 G | Refills: 5 | Status: SHIPPED | OUTPATIENT
Start: 2020-02-03 | End: 2021-08-10 | Stop reason: SDUPTHER

## 2020-02-03 RX ORDER — CALCIPOTRIENE 50 UG/G
CREAM TOPICAL
Qty: 120 G | Refills: 5 | Status: SHIPPED | OUTPATIENT
Start: 2020-02-03 | End: 2021-08-10 | Stop reason: SDUPTHER

## 2020-02-03 RX ORDER — LISINOPRIL AND HYDROCHLOROTHIAZIDE 20; 12.5 MG/1; MG/1
2 TABLET ORAL DAILY
Qty: 180 TABLET | Refills: 1 | Status: SHIPPED | OUTPATIENT
Start: 2020-02-03 | End: 2020-08-04 | Stop reason: SDUPTHER

## 2020-02-03 RX ORDER — FUROSEMIDE 40 MG/1
40 TABLET ORAL DAILY
Qty: 90 TABLET | Refills: 1 | Status: SHIPPED | OUTPATIENT
Start: 2020-02-03 | End: 2020-08-07 | Stop reason: SDUPTHER

## 2020-02-03 NOTE — PROGRESS NOTES
"Subjective   Ganesh Viera is a 69 y.o. male.     History of Present Illness    Since the last visit, he has overall felt well.  He has Essential Hypertension and well controlled on current medication and gout which is well controlled on allopurinol.  he has been compliant with current medications have reviewed them.  The patient denies medication side effects.  Will refill medications. /78   Pulse 64   Resp 16   Ht 172.7 cm (68\")   Wt (!) 141 kg (311 lb)   SpO2 97%   BMI 47.29 kg/m²     Results for orders placed or performed during the hospital encounter of 06/25/18   Adult Transthoracic Echo Complete W/ Cont if Necessary Per Protocol   Result Value Ref Range    TDI S' 12.00 cm/sec    RV Base 3.30 cm    Sinus 3.90 cm    STJ 3.60 cm    Ascending aorta 4.20 cm    LA Volume Index 29.0 mL/m2    EF(MOD-bp) 58 %    Lat Peak E' Brian 10.0 cm/sec    Med Peak E' Brian 8.00 cm/sec    TAPSE (>1.6) 3.20 cm2    BSA 2.4 m^2    IVSd 1.5 cm    LVIDd 5.0 cm    LVIDs 3.7 cm    LVPWd 1.2 cm    IVS/LVPW 1.2     FS 26.6 %    EDV(Teich) 119.6 ml    ESV(Teich) 57.7 ml    EF(Teich) 51.8 %    EF(cubed) 60.5 %    LV mass(C)d 281.4 grams    LV mass(C)dI 117.1 grams/m^2    SV(Teich) 61.9 ml    SI(Teich) 25.8 ml/m^2    SV(cubed) 76.7 ml    SI(cubed) 31.9 ml/m^2    Ao root diam 3.7 cm    Ao root area 11.0 cm^2    ACS 2.2 cm    asc Aorta Diam 4.2 cm    LVOT diam 2.1 cm    LVOT area 3.6 cm^2    LVOT area(traced) 3.5 cm^2    RVOT diam 2.6 cm    RVOT area 5.2 cm^2    LVLd ap4 8.6 cm    EDV(MOD-sp4) 91.0 ml    LVLs ap4 6.9 cm    ESV(MOD-sp4) 39.0 ml    EF(MOD-sp4) 57.1 %    LVLd ap2 8.2 cm    EDV(MOD-sp2) 96.0 ml    LVLs ap2 7.1 cm    ESV(MOD-sp2) 40.0 ml    EF(MOD-sp2) 58.3 %    SV(MOD-sp4) 52.0 ml    SI(MOD-sp4) 21.6 ml/m^2    SV(MOD-sp2) 56.0 ml    SI(MOD-sp2) 23.3 ml/m^2    Ao root area (BSA corrected) 1.6     EF - Contrast (2Ch) 58.3 ml/m^2    EF - Contrast (4Ch) 57.1 ml/m^2    LV Scott Vol (BSA corrected) 37.9 ml/m^2    LV Sys Vol " (BSA corrected) 16.2 ml/m^2    MV A dur 0.12 sec    MV E max brian 73.7 cm/sec    MV A max brian 108.2 cm/sec    MV E/A 0.68     MV V2 max 112.7 cm/sec    MV max PG 5.1 mmHg    MV V2 mean 64.7 cm/sec    MV mean PG 2.0 mmHg    MV V2 VTI 26.0 cm    MVA(VTI) 2.6 cm^2    MV P1/2t max brian 66.5 cm/sec    MV P1/2t 65.5 msec    MVA(P1/2t) 3.4 cm^2    MV dec slope 297.0 cm/sec^2    MV dec time 0.28 sec    Ao pk brian 168.3 cm/sec    Ao max PG 11.3 mmHg    Ao max PG (full) 7.7 mmHg    Ao V2 mean 101.8 cm/sec    Ao mean PG 5.2 mmHg    Ao mean PG (full) 3.1 mmHg    Ao V2 VTI 33.0 cm    LORENZO(I,A) 2.0 cm^2    LORENZO(I,D) 2.0 cm^2    LORENZO(V,A) 2.0 cm^2    LORENZO(V,D) 2.0 cm^2    LV V1 max PG 3.7 mmHg    LV V1 mean PG 2.1 mmHg    LV V1 max 95.6 cm/sec    LV V1 mean 67.2 cm/sec    LV V1 VTI 18.7 cm    SV(Ao) 362.3 ml    SI(Ao) 150.7 ml/m^2    SV(LVOT) 67.2 ml    SV(RVOT) 68.9 ml    SI(LVOT) 28.0 ml/m^2    PA V2 max 114.0 cm/sec    PA max PG 5.2 mmHg    PA max PG (full) 3.4 mmHg    BH CV ECHO KRISTY - PVA(V,A) 3.0 cm^2    BH CV ECHO KRISTY - PVA(V,D) 3.0 cm^2    PA acc time 0.13 sec    RV V1 max PG 1.8 mmHg    RV V1 mean PG 0.99 mmHg    RV V1 max 67.4 cm/sec    RV V1 mean 44.7 cm/sec    RV V1 VTI 13.4 cm    PA pr(Accel) 18.8 mmHg    Pulm Sys Brian 48.2 cm/sec    Pulm Scott Brian 34.3 cm/sec    Pulm S/D 1.4     Qp/Qs 1.0     MVA P1/2T LCG 3.3 cm^2    BH CV ECHO KRISTY - BZI_BMI 44.6 kilograms/m^2    BH CV ECHO KRISTY - BSA(HAYCOCK) 2.6 m^2    BH CV ECHO KRISTY - BZI_METRIC_WEIGHT 132.9 kg    BH CV ECHO KRISTY - BZI_METRIC_HEIGHT 172.7 cm    Avg E/e' ratio 8.19     Echo EF Estimated 58 %           The following portions of the patient's history were reviewed and updated as appropriate: allergies, current medications, past family history, past medical history, past social history, past surgical history and problem list.    Review of Systems   Constitutional: Negative for fatigue.   Respiratory: Negative for cough and shortness of breath.    Cardiovascular: Negative  for chest pain and palpitations.   Endocrine: Negative for cold intolerance, heat intolerance, polydipsia, polyphagia and polyuria.   Skin: Negative for rash.   Psychiatric/Behavioral: Negative for dysphoric mood and sleep disturbance. The patient is not nervous/anxious.        Objective   Physical Exam   Constitutional: He is oriented to person, place, and time. He appears well-developed and well-nourished.   Neck: Carotid bruit is not present.   Cardiovascular: Normal rate and regular rhythm.   Pulmonary/Chest: Effort normal and breath sounds normal.   Neurological: He is oriented to person, place, and time.   Skin: Skin is warm and dry.   Psychiatric: He has a normal mood and affect. His behavior is normal. Judgment and thought content normal.   Nursing note and vitals reviewed.      Assessment/Plan   Ganesh was seen today for hypertension.    Diagnoses and all orders for this visit:    Benign essential hypertension  -     lisinopril-hydrochlorothiazide (PRINZIDE,ZESTORETIC) 20-12.5 MG per tablet; Take 2 tablets by mouth Daily.  -     nisoldipine (SULAR) 34 MG 24 hr tablet; Take 1 tablet by mouth Daily.    Acute gout, unspecified cause, unspecified site    Peripheral edema  -     furosemide (LASIX) 40 MG tablet; Take 1 tablet by mouth Daily.    Psoriasis  -     fluticasone (CUTIVATE) 0.05 % cream; Apply  topically to the appropriate area as directed Daily.  -     clobetasol (TEMOVATE) 0.05 % cream; Apply  topically to the appropriate area as directed Daily.  -     calcipotriene (DOVONEX) 0.005 % cream; Apply  topically to the appropriate area as directed Daily.

## 2020-07-23 DIAGNOSIS — I10 BENIGN ESSENTIAL HYPERTENSION: ICD-10-CM

## 2020-07-23 RX ORDER — LISINOPRIL AND HYDROCHLOROTHIAZIDE 20; 12.5 MG/1; MG/1
2 TABLET ORAL DAILY
Qty: 180 TABLET | Refills: 1 | OUTPATIENT
Start: 2020-07-23

## 2020-07-23 RX ORDER — NISOLDIPINE 34 MG/1
34 TABLET, FILM COATED, EXTENDED RELEASE ORAL DAILY
Qty: 90 TABLET | Refills: 1 | OUTPATIENT
Start: 2020-07-23

## 2020-07-31 DIAGNOSIS — I10 BENIGN ESSENTIAL HYPERTENSION: ICD-10-CM

## 2020-07-31 RX ORDER — NISOLDIPINE 34 MG/1
34 TABLET, FILM COATED, EXTENDED RELEASE ORAL DAILY
Qty: 90 TABLET | Refills: 1 | OUTPATIENT
Start: 2020-07-31

## 2020-07-31 RX ORDER — LISINOPRIL AND HYDROCHLOROTHIAZIDE 20; 12.5 MG/1; MG/1
2 TABLET ORAL DAILY
Qty: 180 TABLET | Refills: 1 | OUTPATIENT
Start: 2020-07-31

## 2020-08-04 ENCOUNTER — TELEPHONE (OUTPATIENT)
Dept: FAMILY MEDICINE CLINIC | Facility: CLINIC | Age: 70
End: 2020-08-04

## 2020-08-04 DIAGNOSIS — I10 BENIGN ESSENTIAL HYPERTENSION: ICD-10-CM

## 2020-08-04 RX ORDER — NISOLDIPINE 34 MG/1
34 TABLET, FILM COATED, EXTENDED RELEASE ORAL DAILY
Qty: 7 TABLET | Refills: 0 | Status: SHIPPED | OUTPATIENT
Start: 2020-08-04 | End: 2020-08-07 | Stop reason: SDUPTHER

## 2020-08-04 RX ORDER — LISINOPRIL AND HYDROCHLOROTHIAZIDE 20; 12.5 MG/1; MG/1
2 TABLET ORAL DAILY
Qty: 14 TABLET | Refills: 0 | Status: SHIPPED | OUTPATIENT
Start: 2020-08-04 | End: 2020-08-07 | Stop reason: SDUPTHER

## 2020-08-07 ENCOUNTER — OFFICE VISIT (OUTPATIENT)
Dept: FAMILY MEDICINE CLINIC | Facility: CLINIC | Age: 70
End: 2020-08-07

## 2020-08-07 VITALS
RESPIRATION RATE: 16 BRPM | HEART RATE: 86 BPM | WEIGHT: 315 LBS | TEMPERATURE: 98.4 F | HEIGHT: 68 IN | DIASTOLIC BLOOD PRESSURE: 78 MMHG | SYSTOLIC BLOOD PRESSURE: 120 MMHG | BODY MASS INDEX: 47.74 KG/M2 | OXYGEN SATURATION: 94 %

## 2020-08-07 DIAGNOSIS — I10 BENIGN ESSENTIAL HYPERTENSION: Primary | ICD-10-CM

## 2020-08-07 DIAGNOSIS — Z12.5 ENCOUNTER FOR PROSTATE CANCER SCREENING: ICD-10-CM

## 2020-08-07 DIAGNOSIS — L40.9 PSORIASIS: ICD-10-CM

## 2020-08-07 DIAGNOSIS — R60.9 PERIPHERAL EDEMA: ICD-10-CM

## 2020-08-07 PROCEDURE — 99214 OFFICE O/P EST MOD 30 MIN: CPT | Performed by: NURSE PRACTITIONER

## 2020-08-07 RX ORDER — CALCIPOTRIENE 50 UG/G
CREAM TOPICAL
Qty: 120 G | Refills: 5 | Status: CANCELLED | OUTPATIENT
Start: 2020-08-07

## 2020-08-07 RX ORDER — FUROSEMIDE 40 MG/1
40 TABLET ORAL DAILY
Qty: 90 TABLET | Refills: 1 | Status: SHIPPED | OUTPATIENT
Start: 2020-08-07 | End: 2021-02-08 | Stop reason: SDUPTHER

## 2020-08-07 RX ORDER — CLOBETASOL PROPIONATE 0.5 MG/G
CREAM TOPICAL DAILY
Qty: 60 G | Refills: 5 | Status: CANCELLED | OUTPATIENT
Start: 2020-08-07

## 2020-08-07 RX ORDER — NISOLDIPINE 34 MG/1
34 TABLET, FILM COATED, EXTENDED RELEASE ORAL DAILY
Qty: 90 TABLET | Refills: 1 | Status: SHIPPED | OUTPATIENT
Start: 2020-08-07 | End: 2021-02-08 | Stop reason: SDUPTHER

## 2020-08-07 RX ORDER — FLUTICASONE PROPIONATE 0.05 %
CREAM (GRAM) TOPICAL DAILY
Qty: 60 G | Refills: 5 | Status: CANCELLED | OUTPATIENT
Start: 2020-08-07

## 2020-08-07 RX ORDER — LISINOPRIL AND HYDROCHLOROTHIAZIDE 20; 12.5 MG/1; MG/1
2 TABLET ORAL DAILY
Qty: 180 TABLET | Refills: 1 | Status: SHIPPED | OUTPATIENT
Start: 2020-08-07 | End: 2021-02-08 | Stop reason: SDUPTHER

## 2020-08-07 NOTE — PROGRESS NOTES
"Subjective   Ganesh Viera is a 69 y.o. male.     History of Present Illness    Since the last visit, he has overall felt well.  He has Essential Hypertension and well controlled on current medication.  he has been compliant with current medications have reviewed them.  The patient denies medication side effects.  Will refill medications. /78   Pulse 86   Temp 98.4 °F (36.9 °C)   Resp 16   Ht 172.7 cm (68\")   Wt (!) 144 kg (318 lb)   SpO2 94%   BMI 48.35 kg/m²     Results for orders placed or performed during the hospital encounter of 06/25/18   Adult Transthoracic Echo Complete W/ Cont if Necessary Per Protocol   Result Value Ref Range    RV S' 12.00 cm/sec    RV Base 3.30 cm    Sinus 3.90 cm    STJ 3.60 cm    Ascending aorta 4.20 cm    LA Volume Index 29.0 mL/m2    EF(MOD-bp) 58 %    Lat Peak E' Brian 10.0 cm/sec    Med Peak E' Brian 8.00 cm/sec    TAPSE (>1.6) 3.20 cm2    BSA 2.4 m^2    IVSd 1.5 cm    LVIDd 5.0 cm    LVIDs 3.7 cm    LVPWd 1.2 cm    IVS/LVPW 1.2     FS 26.6 %    EDV(Teich) 119.6 ml    ESV(Teich) 57.7 ml    EF(Teich) 51.8 %    EF(cubed) 60.5 %    LV mass(C)d 281.4 grams    LV mass(C)dI 117.1 grams/m^2    SV(Teich) 61.9 ml    SI(Teich) 25.8 ml/m^2    SV(cubed) 76.7 ml    SI(cubed) 31.9 ml/m^2    Ao root diam 3.7 cm    Ao root area 11.0 cm^2    ACS 2.2 cm    asc Aorta Diam 4.2 cm    LVOT diam 2.1 cm    LVOT area 3.6 cm^2    LVOT area(traced) 3.5 cm^2    RVOT diam 2.6 cm    RVOT area 5.2 cm^2    LVLd ap4 8.6 cm    EDV(MOD-sp4) 91.0 ml    LVLs ap4 6.9 cm    ESV(MOD-sp4) 39.0 ml    EF(MOD-sp4) 57.1 %    LVLd ap2 8.2 cm    EDV(MOD-sp2) 96.0 ml    LVLs ap2 7.1 cm    ESV(MOD-sp2) 40.0 ml    EF(MOD-sp2) 58.3 %    SV(MOD-sp4) 52.0 ml    SI(MOD-sp4) 21.6 ml/m^2    SV(MOD-sp2) 56.0 ml    SI(MOD-sp2) 23.3 ml/m^2    Ao root area (BSA corrected) 1.6     EF - Contrast (2Ch) 58.3 ml/m^2    EF - Contrast (4Ch) 57.1 ml/m^2    LV Scott Vol (BSA corrected) 37.9 ml/m^2    LV Sys Vol (BSA corrected) 16.2 ml/m^2 "    MV A dur 0.12 sec    MV E max brian 73.7 cm/sec    MV A max brian 108.2 cm/sec    MV E/A 0.68     MV V2 max 112.7 cm/sec    MV max PG 5.1 mmHg    MV V2 mean 64.7 cm/sec    MV mean PG 2.0 mmHg    MV V2 VTI 26.0 cm    MVA(VTI) 2.6 cm^2    MV P1/2t max brian 66.5 cm/sec    MV P1/2t 65.5 msec    MVA(P1/2t) 3.4 cm^2    MV dec slope 297.0 cm/sec^2    MV dec time 0.28 sec    Ao pk brian 168.3 cm/sec    Ao max PG 11.3 mmHg    Ao max PG (full) 7.7 mmHg    Ao V2 mean 101.8 cm/sec    Ao mean PG 5.2 mmHg    Ao mean PG (full) 3.1 mmHg    Ao V2 VTI 33.0 cm    LORENZO(I,A) 2.0 cm^2    LORENZO(I,D) 2.0 cm^2    LORENZO(V,A) 2.0 cm^2    LORENZO(V,D) 2.0 cm^2    LV V1 max PG 3.7 mmHg    LV V1 mean PG 2.1 mmHg    LV V1 max 95.6 cm/sec    LV V1 mean 67.2 cm/sec    LV V1 VTI 18.7 cm    SV(Ao) 362.3 ml    SI(Ao) 150.7 ml/m^2    SV(LVOT) 67.2 ml    SV(RVOT) 68.9 ml    SI(LVOT) 28.0 ml/m^2    PA V2 max 114.0 cm/sec    PA max PG 5.2 mmHg    PA max PG (full) 3.4 mmHg    BH CV ECHO KRISTY - PVA(V,A) 3.0 cm^2    BH CV ECHO KRISTY - PVA(V,D) 3.0 cm^2    PA acc time 0.13 sec    RV V1 max PG 1.8 mmHg    RV V1 mean PG 0.99 mmHg    RV V1 max 67.4 cm/sec    RV V1 mean 44.7 cm/sec    RV V1 VTI 13.4 cm    PA pr(Accel) 18.8 mmHg    Pulm Sys Brian 48.2 cm/sec    Pulm Scott Brian 34.3 cm/sec    Pulm S/D 1.4     Qp/Qs 1.0     MVA P1/2T LCG 3.3 cm^2    BH CV ECHO KRISTY - BZI_BMI 44.6 kilograms/m^2    BH CV ECHO KRISTY - BSA(HAYCOCK) 2.6 m^2     CV ECHO KRISTY - BZI_METRIC_WEIGHT 132.9 kg     CV ECHO KRSITY - BZI_METRIC_HEIGHT 172.7 cm    Avg E/e' ratio 8.19     Echo EF Estimated 58 %       Patient sees rheumatology regularly for his psoriatic arthritis.  He has labs done with each visit.      Patient had elevated PSA in 2018 and saw urology. He states it was repeated and was normal. He does not continue to f/u.   The following portions of the patient's history were reviewed and updated as appropriate: allergies, current medications, past family history, past medical history, past social  history, past surgical history and problem list.    Review of Systems   Constitutional: Negative for fatigue.   Respiratory: Negative for cough and shortness of breath.    Cardiovascular: Negative for chest pain and palpitations.   Skin: Negative for rash.   Psychiatric/Behavioral: Negative for dysphoric mood and sleep disturbance. The patient is not nervous/anxious.        Objective   Physical Exam   Constitutional: He is oriented to person, place, and time. He appears well-developed and well-nourished.   Neck: Carotid bruit is not present.   Cardiovascular: Normal rate and regular rhythm.   Pulmonary/Chest: Effort normal and breath sounds normal.   Neurological: He is alert and oriented to person, place, and time.   Psychiatric: He has a normal mood and affect. His behavior is normal. Judgment and thought content normal.   Nursing note and vitals reviewed.      Assessment/Plan   Ganesh was seen today for hypertension.    Diagnoses and all orders for this visit:    Benign essential hypertension  -     Comprehensive metabolic panel  -     Lipid panel  -     CBC and Differential  -     TSH  -     nisoldipine (SULAR) 34 MG 24 hr tablet; Take 1 tablet by mouth Daily.  -     lisinopril-hydrochlorothiazide (PRINZIDE,ZESTORETIC) 20-12.5 MG per tablet; Take 2 tablets by mouth Daily.    Peripheral edema  -     furosemide (LASIX) 40 MG tablet; Take 1 tablet by mouth Daily.    Psoriasis    Encounter for prostate cancer screening  -     PSA Screen        Labs this week including PSA.

## 2020-10-08 ENCOUNTER — OFFICE VISIT (OUTPATIENT)
Dept: CARDIOLOGY | Facility: CLINIC | Age: 70
End: 2020-10-08

## 2020-10-08 VITALS
WEIGHT: 315 LBS | HEIGHT: 68 IN | OXYGEN SATURATION: 97 % | SYSTOLIC BLOOD PRESSURE: 132 MMHG | BODY MASS INDEX: 47.74 KG/M2 | HEART RATE: 98 BPM | DIASTOLIC BLOOD PRESSURE: 94 MMHG

## 2020-10-08 DIAGNOSIS — Z86.79 HISTORY OF CHF (CONGESTIVE HEART FAILURE): ICD-10-CM

## 2020-10-08 DIAGNOSIS — I10 BENIGN ESSENTIAL HYPERTENSION: ICD-10-CM

## 2020-10-08 DIAGNOSIS — I48.0 PAROXYSMAL ATRIAL FIBRILLATION (HCC): Primary | ICD-10-CM

## 2020-10-08 DIAGNOSIS — R60.9 PERIPHERAL EDEMA: ICD-10-CM

## 2020-10-08 DIAGNOSIS — E66.01 MORBID OBESITY WITH BMI OF 45.0-49.9, ADULT (HCC): ICD-10-CM

## 2020-10-08 PROCEDURE — 93000 ELECTROCARDIOGRAM COMPLETE: CPT | Performed by: NURSE PRACTITIONER

## 2020-10-08 PROCEDURE — 99214 OFFICE O/P EST MOD 30 MIN: CPT | Performed by: NURSE PRACTITIONER

## 2020-10-08 RX ORDER — CLOBETASOL PROPIONATE 0.5 MG/G
EMULSION TOPICAL
COMMUNITY
Start: 2020-10-07

## 2020-10-08 RX ORDER — ALLOPURINOL 300 MG/1
100 TABLET ORAL DAILY
COMMUNITY
Start: 2020-09-14

## 2020-10-08 RX ORDER — METOPROLOL SUCCINATE 50 MG/1
50 TABLET, EXTENDED RELEASE ORAL DAILY
Qty: 90 TABLET | Refills: 1 | Status: SHIPPED | OUTPATIENT
Start: 2020-10-08 | End: 2020-11-23

## 2020-10-08 NOTE — PROGRESS NOTES
Date of Office Visit: 10/08/2020  Encounter Provider: SHAKILA Senior  Primary Cardiologist: Dr. Moreno  Place of Service: [facility] CARDIOLOGY  Patient Name: Ganesh Viera  :1950      Subjective:     Chief Complaint:  6-month follow-up    History of Present Illness:  Ganesh Viera is a pleasant 70 y.o. male who I have seen once before.  Outside records have been requested and reviewed by me if available.     This is a patient of Dr. Moreno with a history of hypertension, paroxysmal atrial fibrillation, morbid obesity, history of diastolic heart failure    I first saw him 2019 for 6-month follow-up.  Patient was unfortunately back in atrial fibrillation.  We checked a Holter and he had about a 6% atrial fibrillation burden.  He was placed back on Xarelto at that time.    . 2019 follow-up patient was feeling well.  He had some leg swelling.   He was in sinus rhythm at that time.  No changes were made.    2020 was last in the office to see Dr. Moreno patient was feeling fine no major problems.  No changes were made and his Xarelto was stopped at that time as had been no evidence of recurrence of atrial fibrillation as he was checking his pulse regularly throughout the day and noticed no irregularity.    Patient is presenting today for overdue follow-up.  He has no major complaints.  He denies chest pain, shortness of breath, palpitations, dizziness, lightheadedness, syncope.  His lower extremity edema he feels is controlled on his current diuretic regimen.  He is not doing much in the way of physical activity as he is retired and has not been getting out much due to COVID.  He has been doing some work around his house without limiting symptoms.  He does have some snoring but does not believe he has sleep apnea and would not be willing to get tested.  He has not been checking his blood pressure regularly but last several blood pressure checks have  been under good control.  He is borderline elevated in our office today.  Is not having any symptoms of hypertension.  He notes that he checks his pulse frequently throughout the day and has not noticed any irregularity in his pulse though he never had symptoms with his atrial fibrillation before.  He is considering getting a Fitbit watch.  He drinks about 2 cups of coffee in the morning and sometimes denies tea in the afternoon.  He drinks alcohol on occasion not daily.    Past Medical History:   Diagnosis Date   • Benign essential hypertension    • CHF (congestive heart failure) (CMS/[facility])    • Eye exam, routine 2011   • H/O Leukocytosis    • New onset atrial fibrillation (CMS/[facility]) 10/03/2018   • Osteoarthritis    • Peripheral edema    • Psoriasis      History reviewed. No pertinent surgical history.  Outpatient Medications Prior to Visit   Medication Sig Dispense Refill   • Acetaminophen (TYLENOL ARTHRITIS PAIN PO) Take  by mouth 2 (Two) Times a Day.     • allopurinol (ZYLOPRIM) 100 MG tablet Take 2 tablets by mouth Daily.     • allopurinol (ZYLOPRIM) 300 MG tablet Take  by mouth Daily.     • calcipotriene (DOVONEX) 0.005 % cream Apply  topically to the appropriate area as directed Daily. 120 g 5   • clobetasol (TEMOVATE) 0.05 % cream Apply  topically to the appropriate area as directed Daily. 60 g 5   • Clobetasol Propionate E 0.05 % emollient cream      • colchicine 0.6 MG tablet Take 1 tablet by mouth Daily.     • diclofenac (VOLTAREN) 1 % gel gel apply to affected area four times a day 500 g 5   • fluticasone (CUTIVATE) 0.05 % cream Apply  topically to the appropriate area as directed Daily. 60 g 5   • furosemide (LASIX) 40 MG tablet Take 1 tablet by mouth Daily. 90 tablet 1   • lisinopril-hydrochlorothiazide (PRINZIDE,ZESTORETIC) 20-12.5 MG per tablet Take 2 tablets by mouth Daily. 180 tablet 1   • Multiple Vitamins-Minerals (MULTIVITAMIN ADULT PO) Take  by mouth Daily.     • nisoldipine (SULAR) 34 MG 24 hr  tablet Take 1 tablet by mouth Daily. 90 tablet 1   • Thiamine HCl (VITAMIN B-1) 50 MG tablet Take 50 mg by mouth daily.     • metoprolol succinate XL (TOPROL-XL) 50 MG 24 hr tablet TAKE 1 TABLET BY MOUTH ONCE DAILY 90 tablet 1     No facility-administered medications prior to visit.        Allergies as of 10/08/2020 - Reviewed 10/08/2020   Allergen Reaction Noted   • Pollen extract  04/10/2017     Social History     Socioeconomic History   • Marital status:      Spouse name: Not on file   • Number of children: Not on file   • Years of education: High school   • Highest education level: Not on file   Occupational History   • Occupation: [employer]     Employer: RETIRED   Tobacco Use   • Smoking status: Former Smoker     Packs/day: 1.00     Years: 10.00     Pack years: 10.00     Types: Cigarettes   • Smokeless tobacco: Never Used   • Tobacco comment: Caffeine use   Substance and Sexual Activity   • Alcohol use: Yes     Comment: occ   • Drug use: No   • Sexual activity: Defer     Family History   Problem Relation Age of Onset   • Heart disease Mother    • Hypertension Mother    • Heart attack Mother    • Diabetes Father    • Hypertension Father    • Kidney disease Father    • Alzheimer's disease Father      Review of Systems   Constitution: Negative for chills, fever and malaise/fatigue.   HENT: Negative for ear pain, hearing loss, nosebleeds and sore throat.    Eyes: Negative for double vision, pain, vision loss in left eye and vision loss in right eye.   Cardiovascular: Positive for leg swelling (controlled with diuretics). Negative for chest pain, claudication, dyspnea on exertion, irregular heartbeat, near-syncope, orthopnea, palpitations, paroxysmal nocturnal dyspnea and syncope.   Respiratory: Positive for snoring. Negative for cough, shortness of breath and wheezing.    Endocrine: Negative for cold intolerance and heat intolerance.   Hematologic/Lymphatic: Negative for bleeding problem.   Skin: Negative for  "color change, itching, rash and unusual hair distribution.   Musculoskeletal: Positive for joint pain. Negative for joint swelling.   Gastrointestinal: Negative for abdominal pain, diarrhea, hematochezia, melena, nausea and vomiting.   Genitourinary: Negative for decreased libido, frequency, hematuria, hesitancy and incomplete emptying.   Neurological: Negative for excessive daytime sleepiness, dizziness, headaches, light-headedness, loss of balance, numbness, paresthesias and seizures.   Psychiatric/Behavioral: Negative for depression.          Objective:     Vitals:    10/08/20 1400 10/08/20 1420   BP: 132/94    BP Location: Left arm    Patient Position: Sitting    Pulse: 98 98   SpO2:  97%   Weight: (!) 143 kg (315 lb)    Height: 172.7 cm (68\")      Body mass index is 47.9 kg/m².    PHYSICAL EXAM:  Vitals signs and nursing note reviewed.   Constitutional:       General: Not in acute distress.     Appearance: Well-developed and not in distress. Not diaphoretic.   Eyes:      Comments: Wearing glasses   HENT:      Head: Normocephalic and atraumatic.   Neck:      Vascular: No carotid bruit.      Comments: Difficult to assess for JVD due to body habitus  Pulmonary:      Effort: Pulmonary effort is normal. No respiratory distress.      Breath sounds: Normal breath sounds. No wheezing. No rhonchi. No rales.   Cardiovascular:      Normal rate. Frequent ectopic beats. Regular rhythm.   Pulses:     Radial: 2+ bilaterally.  Edema:     Peripheral edema present.     Pretibial: bilateral 1+ edema of the pretibial area.     Ankle: bilateral 1+ edema of the ankle.  Abdominal:      General: Bowel sounds are normal. There is no distension.      Palpations: Abdomen is soft.      Tenderness: There is no abdominal tenderness.      Comments: Obese abdomen   Musculoskeletal: Normal range of motion.   Skin:     General: Skin is warm and dry.      Findings: No erythema.   Neurological:      Mental Status: Alert, oriented to person, " place, and time and oriented to person, place and time.   Psychiatric:         Attention and Perception: Attention normal.         Mood and Affect: Mood normal.         Speech: Speech normal.         Behavior: Behavior normal.         Thought Content: Thought content normal.         Cognition and Memory: Cognition normal.         Judgment: Judgment normal.           ECG 12 Lead    Date/Time: 10/8/2020 2:00 PM  Performed by: Jenn Juarez APRN  Authorized by: Jenn Juarez APRN   Comparison: compared with previous ECG from 1/16/2020  Similar to previous ECG  Comparison to previous ECG: apcs new  Rhythm: sinus rhythm  Ectopy: atrial premature contractions  Rate: normal  BPM: 68  Conduction: left anterior fascicular block and 1st degree AV block  QRS axis: left  Other findings: non-specific ST-T wave changes, left atrial abnormality and poor R wave progression  Other findings comments: late transition    Clinical impression: abnormal EKG  Comments: Indication: PAF, HTN            Most recent lab review:  None. Encouraged him to get the routine labs ordered with PCP as he is about 2 years overdue.    Assessment:       Diagnosis Plan   1. Paroxysmal atrial fibrillation (CMS/[facility])     2. Benign essential hypertension     3. Morbid obesity with BMI of 45.0-49.9, adult (CMS/HCC)     4. History of CHF (congestive heart failure)     5. Peripheral edema         Plan:     1. Paroxysmal atrial fibrillation: He is in sinus rhythm today but has frequent PACs.  He was taken off Xarelto January 2020 by Dr. Moreno because there had been no obvious recurrence.  He had a 6% burden on April 2019 Holter monitor.  I think he is at high risk for going back in atrial fibrillation given his weight and other underlying condition and I explained this to him.  We discussed risk factor modification for atrial fibrillation.  Continue metoprolol succinate refills provided.  We have room to go up on this dose if needed if his blood pressures  running higher at home.  Minimization of alcohol and caffeine intake is encouraged.  2. Hypertension: Blood pressure borderline elevated today.  Asked him to keep a log at home and reviewed blood pressure goals and he was to call with high readings.  He is also overdue for lab work and given the fact that he is on 2 diuretics and has history of some renal dysfunction I encouraged him to go get those labs ordered by his PCP within the next week or 2.  He verbalized understanding  3. Morbid obesity: This is a large health risk for the patient and I had a long discussion with him about dietary changes and trying to increase his physical activity to achieve weight loss both for his overall health as well as cardia vascular benefit and atrial fibrillation risk reduction  4. Peripheral edema: He feels his symptoms are well controlled on current diuretic regimen  5. Snoring: I discussed possible sleep apnea testing with him.  He does not want to be tested as he does not feel he has it.  Explained the link between hypertension, obesity, atrial fibrillation and sleep apnea.  He will let me know if he changes his mind.    I advised on the importance of medication compliance, good blood pressure, blood sugar and cholesterol control, as well as heart healthy diet, regular exercise and avoidance of tobacco for cardiovascular disease risk factor modification.         Follow up with Dr. Moreno in 6 months, unless otherwise needed sooner.  I advised the patient to contact our office with any questions or concerns.       It has been a pleasure to participate in this patient's care. Please feel free to contact me with any questions or concerns.     Jenn Juarez, SHAKILA  10/08/2020             Your medication list          Accurate as of October 8, 2020 10:38 PM. If you have any questions, ask your nurse or doctor.            CONTINUE taking these medications      Instructions Last Dose Given Next Dose Due   allopurinol 100 MG  tablet  Commonly known as: ZYLOPRIM      Take 2 tablets by mouth Daily.       allopurinol 300 MG tablet  Commonly known as: ZYLOPRIM      Take  by mouth Daily.       calcipotriene 0.005 % cream  Commonly known as: DOVONEX      Apply  topically to the appropriate area as directed Daily.       clobetasol 0.05 % cream  Commonly known as: TEMOVATE      Apply  topically to the appropriate area as directed Daily.       Clobetasol Propionate E 0.05 % emollient cream  Generic drug: clobetasol prop emollient base           colchicine 0.6 MG tablet      Take 1 tablet by mouth Daily.       diclofenac 1 % gel gel  Commonly known as: VOLTAREN      apply to affected area four times a day       fluticasone 0.05 % cream  Commonly known as: CUTIVATE      Apply  topically to the appropriate area as directed Daily.       furosemide 40 MG tablet  Commonly known as: LASIX      Take 1 tablet by mouth Daily.       lisinopril-hydrochlorothiazide 20-12.5 MG per tablet  Commonly known as: PRINZIDE,ZESTORETIC      Take 2 tablets by mouth Daily.       metoprolol succinate XL 50 MG 24 hr tablet  Commonly known as: TOPROL-XL      Take 1 tablet by mouth Daily.       MULTIVITAMIN ADULT PO      Take  by mouth Daily.       nisoldipine 34 MG 24 hr tablet  Commonly known as: SULAR      Take 1 tablet by mouth Daily.       TYLENOL ARTHRITIS PAIN PO      Take  by mouth 2 (Two) Times a Day.       vitamin B-1 50 MG tablet      Take 50 mg by mouth daily.             Where to Get Your Medications      These medications were sent to [id number] - [facility] [address] AT [facility] [address] [facility] - [phone] [facility] [phone] [place] [address]    Phone: [phone]   · metoprolol succinate XL 50 MG 24 hr tablet         The above medication changes may not have been made by this provider.  Medication list was updated to reflect medications patient is currently taking including medication  changes and discontinuations made by other healthcare providers or the patients themselves.     Dictated utilizing Dragon Dictation System.

## 2020-11-23 RX ORDER — METOPROLOL SUCCINATE 50 MG/1
TABLET, EXTENDED RELEASE ORAL
Qty: 90 TABLET | Refills: 1 | Status: SHIPPED | OUTPATIENT
Start: 2020-11-23 | End: 2021-05-21

## 2021-01-19 DIAGNOSIS — I10 BENIGN ESSENTIAL HYPERTENSION: ICD-10-CM

## 2021-01-20 RX ORDER — NISOLDIPINE 34 MG/1
34 TABLET, FILM COATED, EXTENDED RELEASE ORAL DAILY
Qty: 90 TABLET | Refills: 1 | OUTPATIENT
Start: 2021-01-20

## 2021-01-20 RX ORDER — LISINOPRIL AND HYDROCHLOROTHIAZIDE 20; 12.5 MG/1; MG/1
2 TABLET ORAL DAILY
Qty: 180 TABLET | Refills: 1 | OUTPATIENT
Start: 2021-01-20

## 2021-02-08 ENCOUNTER — OFFICE VISIT (OUTPATIENT)
Dept: FAMILY MEDICINE CLINIC | Facility: CLINIC | Age: 71
End: 2021-02-08

## 2021-02-08 VITALS
HEIGHT: 68 IN | HEART RATE: 86 BPM | DIASTOLIC BLOOD PRESSURE: 84 MMHG | OXYGEN SATURATION: 97 % | BODY MASS INDEX: 47.59 KG/M2 | TEMPERATURE: 97 F | WEIGHT: 314 LBS | RESPIRATION RATE: 16 BRPM | SYSTOLIC BLOOD PRESSURE: 120 MMHG

## 2021-02-08 DIAGNOSIS — I10 BENIGN ESSENTIAL HYPERTENSION: ICD-10-CM

## 2021-02-08 DIAGNOSIS — I48.0 PAROXYSMAL ATRIAL FIBRILLATION (HCC): ICD-10-CM

## 2021-02-08 DIAGNOSIS — Z87.39 HISTORY OF GOUT: ICD-10-CM

## 2021-02-08 DIAGNOSIS — R60.9 PERIPHERAL EDEMA: ICD-10-CM

## 2021-02-08 DIAGNOSIS — Z86.79 HISTORY OF CHF (CONGESTIVE HEART FAILURE): Primary | ICD-10-CM

## 2021-02-08 PROCEDURE — 99214 OFFICE O/P EST MOD 30 MIN: CPT | Performed by: NURSE PRACTITIONER

## 2021-02-08 RX ORDER — NISOLDIPINE 34 MG/1
34 TABLET, FILM COATED, EXTENDED RELEASE ORAL DAILY
Qty: 90 TABLET | Refills: 1 | Status: SHIPPED | OUTPATIENT
Start: 2021-02-08 | End: 2021-08-03

## 2021-02-08 RX ORDER — LISINOPRIL AND HYDROCHLOROTHIAZIDE 20; 12.5 MG/1; MG/1
2 TABLET ORAL DAILY
Qty: 180 TABLET | Refills: 1 | Status: SHIPPED | OUTPATIENT
Start: 2021-02-08 | End: 2021-08-03

## 2021-02-08 RX ORDER — FUROSEMIDE 40 MG/1
40 TABLET ORAL DAILY
Qty: 90 TABLET | Refills: 1 | Status: SHIPPED | OUTPATIENT
Start: 2021-02-08 | End: 2021-08-10 | Stop reason: SDUPTHER

## 2021-02-08 NOTE — PROGRESS NOTES
"Subjective   Ganesh Viera is a 70 y.o. male.     History of Present Illness    Since the last visit, he has overall felt well.  He has Essential Hypertension and well controlled on current medication, CAD and remains under care of their Cardiologist for mangement and gout which is well controlled on allopurinol. He sees Dr. Salazar for this.  he has been compliant with current medications have reviewed them.  The patient denies medication side effects.  Will refill medications. /84   Pulse 86   Temp 97 °F (36.1 °C)   Resp 16   Ht 172.7 cm (68\")   Wt (!) 142 kg (314 lb)   SpO2 97%   BMI 47.74 kg/m²     Results for orders placed or performed during the hospital encounter of 06/25/18   Adult Transthoracic Echo Complete W/ Cont if Necessary Per Protocol   Result Value Ref Range    RV S' 12.00 cm/sec    RV Base 3.30 cm    Sinus 3.90 cm    STJ 3.60 cm    Ascending aorta 4.20 cm    LA Volume Index 29.0 mL/m2    EF(MOD-bp) 58 %    Lat Peak E' Brian 10.0 cm/sec    Med Peak E' Brian 8.00 cm/sec    TAPSE (>1.6) 3.20 cm2    BSA 2.4 m^2    IVSd 1.5 cm    LVIDd 5.0 cm    LVIDs 3.7 cm    LVPWd 1.2 cm    IVS/LVPW 1.2     FS 26.6 %    EDV(Teich) 119.6 ml    ESV(Teich) 57.7 ml    EF(Teich) 51.8 %    EF(cubed) 60.5 %    LV mass(C)d 281.4 grams    LV mass(C)dI 117.1 grams/m^2    SV(Teich) 61.9 ml    SI(Teich) 25.8 ml/m^2    SV(cubed) 76.7 ml    SI(cubed) 31.9 ml/m^2    Ao root diam 3.7 cm    Ao root area 11.0 cm^2    ACS 2.2 cm    asc Aorta Diam 4.2 cm    LVOT diam 2.1 cm    LVOT area 3.6 cm^2    LVOT area(traced) 3.5 cm^2    RVOT diam 2.6 cm    RVOT area 5.2 cm^2    LVLd ap4 8.6 cm    EDV(MOD-sp4) 91.0 ml    LVLs ap4 6.9 cm    ESV(MOD-sp4) 39.0 ml    EF(MOD-sp4) 57.1 %    LVLd ap2 8.2 cm    EDV(MOD-sp2) 96.0 ml    LVLs ap2 7.1 cm    ESV(MOD-sp2) 40.0 ml    EF(MOD-sp2) 58.3 %    SV(MOD-sp4) 52.0 ml    SI(MOD-sp4) 21.6 ml/m^2    SV(MOD-sp2) 56.0 ml    SI(MOD-sp2) 23.3 ml/m^2    Ao root area (BSA corrected) 1.6     EF - " Contrast (2Ch) 58.3 ml/m^2    EF - Contrast (4Ch) 57.1 ml/m^2    LV Scott Vol (BSA corrected) 37.9 ml/m^2    LV Sys Vol (BSA corrected) 16.2 ml/m^2    MV A dur 0.12 sec    MV E max brian 73.7 cm/sec    MV A max brian 108.2 cm/sec    MV E/A 0.68     MV V2 max 112.7 cm/sec    MV max PG 5.1 mmHg    MV V2 mean 64.7 cm/sec    MV mean PG 2.0 mmHg    MV V2 VTI 26.0 cm    MVA(VTI) 2.6 cm^2    MV P1/2t max brian 66.5 cm/sec    MV P1/2t 65.5 msec    MVA(P1/2t) 3.4 cm^2    MV dec slope 297.0 cm/sec^2    MV dec time 0.28 sec    Ao pk brian 168.3 cm/sec    Ao max PG 11.3 mmHg    Ao max PG (full) 7.7 mmHg    Ao V2 mean 101.8 cm/sec    Ao mean PG 5.2 mmHg    Ao mean PG (full) 3.1 mmHg    Ao V2 VTI 33.0 cm    LORENZO(I,A) 2.0 cm^2    LORENZO(I,D) 2.0 cm^2    LORENZO(V,A) 2.0 cm^2    LORENZO(V,D) 2.0 cm^2    LV V1 max PG 3.7 mmHg    LV V1 mean PG 2.1 mmHg    LV V1 max 95.6 cm/sec    LV V1 mean 67.2 cm/sec    LV V1 VTI 18.7 cm    SV(Ao) 362.3 ml    SI(Ao) 150.7 ml/m^2    SV(LVOT) 67.2 ml    SV(RVOT) 68.9 ml    SI(LVOT) 28.0 ml/m^2    PA V2 max 114.0 cm/sec    PA max PG 5.2 mmHg    PA max PG (full) 3.4 mmHg    BH CV ECHO KRISTY - PVA(V,A) 3.0 cm^2    BH CV ECHO KRISTY - PVA(V,D) 3.0 cm^2    PA acc time 0.13 sec    RV V1 max PG 1.8 mmHg    RV V1 mean PG 0.99 mmHg    RV V1 max 67.4 cm/sec    RV V1 mean 44.7 cm/sec    RV V1 VTI 13.4 cm    PA pr(Accel) 18.8 mmHg    Pulm Sys Brian 48.2 cm/sec    Pulm Scott Brian 34.3 cm/sec    Pulm S/D 1.4     Qp/Qs 1.0     MVA P1/2T LCG 3.3 cm^2    BH CV ECHO KRISTY - BZI_BMI 44.6 kilograms/m^2     CV ECHO KRISTY - BSA(HAYCOCK) 2.6 m^2     CV ECHO KRISTY - BZI_METRIC_WEIGHT 132.9 kg     CV ECHO KRISTY - BZI_METRIC_HEIGHT 172.7 cm    Avg E/e' ratio 8.19     Echo EF Estimated 58 %         The following portions of the patient's history were reviewed and updated as appropriate: allergies, current medications, past family history, past medical history, past social history, past surgical history and problem list.    Review of Systems    Constitutional: Negative for fatigue.   Respiratory: Negative for cough and shortness of breath.    Cardiovascular: Negative for chest pain and palpitations.   Endocrine: Negative for cold intolerance, heat intolerance, polydipsia, polyphagia and polyuria.   Skin: Negative for rash.   Psychiatric/Behavioral: Negative for dysphoric mood and sleep disturbance. The patient is not nervous/anxious.        Objective   Physical Exam  Vitals signs and nursing note reviewed.   Constitutional:       Appearance: Normal appearance. He is well-developed.   Neck:      Vascular: No carotid bruit.   Cardiovascular:      Rate and Rhythm: Normal rate and regular rhythm.   Pulmonary:      Effort: Pulmonary effort is normal.      Breath sounds: Normal breath sounds.   Neurological:      Mental Status: He is alert and oriented to person, place, and time.   Psychiatric:         Mood and Affect: Mood normal.         Behavior: Behavior normal.         Thought Content: Thought content normal.         Judgment: Judgment normal.         Assessment/Plan   Diagnoses and all orders for this visit:    1. Benign essential hypertension  -     lisinopril-hydrochlorothiazide (PRINZIDE,ZESTORETIC) 20-12.5 MG per tablet; Take 2 tablets by mouth Daily.  Dispense: 180 tablet; Refill: 1  -     nisoldipine (SULAR) 34 MG 24 hr tablet; Take 1 tablet by mouth Daily.  Dispense: 90 tablet; Refill: 1    2. Peripheral edema  -     furosemide (LASIX) 40 MG tablet; Take 1 tablet by mouth Daily.  Dispense: 90 tablet; Refill: 1

## 2021-03-08 ENCOUNTER — IMMUNIZATION (OUTPATIENT)
Dept: VACCINE CLINIC | Facility: HOSPITAL | Age: 71
End: 2021-03-08

## 2021-03-08 PROCEDURE — 0001A: CPT | Performed by: INTERNAL MEDICINE

## 2021-03-08 PROCEDURE — 91300 HC SARSCOV02 VAC 30MCG/0.3ML IM: CPT | Performed by: INTERNAL MEDICINE

## 2021-03-29 ENCOUNTER — IMMUNIZATION (OUTPATIENT)
Dept: VACCINE CLINIC | Facility: HOSPITAL | Age: 71
End: 2021-03-29

## 2021-03-29 PROCEDURE — 91300 HC SARSCOV02 VAC 30MCG/0.3ML IM: CPT | Performed by: INTERNAL MEDICINE

## 2021-03-29 PROCEDURE — 0002A: CPT | Performed by: INTERNAL MEDICINE

## 2021-04-08 ENCOUNTER — LAB (OUTPATIENT)
Dept: LAB | Facility: HOSPITAL | Age: 71
End: 2021-04-08

## 2021-04-08 ENCOUNTER — OFFICE VISIT (OUTPATIENT)
Dept: CARDIOLOGY | Facility: CLINIC | Age: 71
End: 2021-04-08

## 2021-04-08 VITALS
HEART RATE: 57 BPM | HEIGHT: 68 IN | OXYGEN SATURATION: 99 % | BODY MASS INDEX: 47.74 KG/M2 | SYSTOLIC BLOOD PRESSURE: 154 MMHG | DIASTOLIC BLOOD PRESSURE: 100 MMHG | WEIGHT: 315 LBS

## 2021-04-08 DIAGNOSIS — N18.30 STAGE 3 CHRONIC KIDNEY DISEASE, UNSPECIFIED WHETHER STAGE 3A OR 3B CKD (HCC): ICD-10-CM

## 2021-04-08 DIAGNOSIS — I10 BENIGN ESSENTIAL HYPERTENSION: ICD-10-CM

## 2021-04-08 DIAGNOSIS — R60.9 PERIPHERAL EDEMA: ICD-10-CM

## 2021-04-08 DIAGNOSIS — I48.0 PAROXYSMAL ATRIAL FIBRILLATION (HCC): Primary | ICD-10-CM

## 2021-04-08 DIAGNOSIS — E66.01 MORBID OBESITY WITH BMI OF 45.0-49.9, ADULT (HCC): ICD-10-CM

## 2021-04-08 LAB
ALBUMIN SERPL-MCNC: 4.3 G/DL (ref 3.5–5.2)
ALBUMIN/GLOB SERPL: 1.3 G/DL
ALP SERPL-CCNC: 109 U/L (ref 39–117)
ALT SERPL W P-5'-P-CCNC: 22 U/L (ref 1–41)
ANION GAP SERPL CALCULATED.3IONS-SCNC: 12.9 MMOL/L (ref 5–15)
AST SERPL-CCNC: 27 U/L (ref 1–40)
BASOPHILS # BLD AUTO: 0.07 10*3/MM3 (ref 0–0.2)
BASOPHILS NFR BLD AUTO: 0.6 % (ref 0–1.5)
BILIRUB SERPL-MCNC: 0.4 MG/DL (ref 0–1.2)
BUN SERPL-MCNC: 20 MG/DL (ref 8–23)
BUN/CREAT SERPL: 15.6 (ref 7–25)
CALCIUM SPEC-SCNC: 9.4 MG/DL (ref 8.6–10.5)
CHLORIDE SERPL-SCNC: 100 MMOL/L (ref 98–107)
CO2 SERPL-SCNC: 27.1 MMOL/L (ref 22–29)
CREAT SERPL-MCNC: 1.28 MG/DL (ref 0.76–1.27)
DEPRECATED RDW RBC AUTO: 43.1 FL (ref 37–54)
EOSINOPHIL # BLD AUTO: 0.2 10*3/MM3 (ref 0–0.4)
EOSINOPHIL NFR BLD AUTO: 1.7 % (ref 0.3–6.2)
ERYTHROCYTE [DISTWIDTH] IN BLOOD BY AUTOMATED COUNT: 13.7 % (ref 12.3–15.4)
GFR SERPL CREATININE-BSD FRML MDRD: 56 ML/MIN/1.73
GLOBULIN UR ELPH-MCNC: 3.2 GM/DL
GLUCOSE SERPL-MCNC: 101 MG/DL (ref 65–99)
HCT VFR BLD AUTO: 46.5 % (ref 37.5–51)
HGB BLD-MCNC: 16.4 G/DL (ref 13–17.7)
IMM GRANULOCYTES # BLD AUTO: 0.08 10*3/MM3 (ref 0–0.05)
IMM GRANULOCYTES NFR BLD AUTO: 0.7 % (ref 0–0.5)
LYMPHOCYTES # BLD AUTO: 1.5 10*3/MM3 (ref 0.7–3.1)
LYMPHOCYTES NFR BLD AUTO: 12.7 % (ref 19.6–45.3)
MCH RBC QN AUTO: 30.9 PG (ref 26.6–33)
MCHC RBC AUTO-ENTMCNC: 35.3 G/DL (ref 31.5–35.7)
MCV RBC AUTO: 87.7 FL (ref 79–97)
MONOCYTES # BLD AUTO: 1.11 10*3/MM3 (ref 0.1–0.9)
MONOCYTES NFR BLD AUTO: 9.4 % (ref 5–12)
NEUTROPHILS NFR BLD AUTO: 74.9 % (ref 42.7–76)
NEUTROPHILS NFR BLD AUTO: 8.84 10*3/MM3 (ref 1.7–7)
NRBC BLD AUTO-RTO: 0 /100 WBC (ref 0–0.2)
PLATELET # BLD AUTO: 247 10*3/MM3 (ref 140–450)
PMV BLD AUTO: 11.3 FL (ref 6–12)
POTASSIUM SERPL-SCNC: 4.2 MMOL/L (ref 3.5–5.2)
PROT SERPL-MCNC: 7.5 G/DL (ref 6–8.5)
RBC # BLD AUTO: 5.3 10*6/MM3 (ref 4.14–5.8)
SODIUM SERPL-SCNC: 140 MMOL/L (ref 136–145)
WBC # BLD AUTO: 11.8 10*3/MM3 (ref 3.4–10.8)

## 2021-04-08 PROCEDURE — 36415 COLL VENOUS BLD VENIPUNCTURE: CPT

## 2021-04-08 PROCEDURE — 93000 ELECTROCARDIOGRAM COMPLETE: CPT | Performed by: INTERNAL MEDICINE

## 2021-04-08 PROCEDURE — 99214 OFFICE O/P EST MOD 30 MIN: CPT | Performed by: INTERNAL MEDICINE

## 2021-04-08 PROCEDURE — 80053 COMPREHEN METABOLIC PANEL: CPT

## 2021-04-08 PROCEDURE — 85025 COMPLETE CBC W/AUTO DIFF WBC: CPT

## 2021-04-08 RX ORDER — NISOLDIPINE 17 MG/1
TABLET, FILM COATED, EXTENDED RELEASE ORAL
COMMUNITY
Start: 2021-02-09 | End: 2022-04-01

## 2021-04-08 NOTE — PROGRESS NOTES
Date of Office Visit: 2021    Patient Name: Ganesh Viera  : 1950    Encounter Provider: Az Moreno MD  Referring Provider: No ref. provider found  Place of Service: [facility] CARDIOLOGY  Patient Care Team:  Stacia Parikh APRN as PCP - General (Family Medicine)  Ganesh Cole MD as Consulting Physician (Hematology and Oncology)      Chief Complaint   Patient presents with   • Atrial Fibrillation   • Hypertension     History of Present Illness    The patient is a 70-year-old white male with a history of hypertension, paroxysmal atrial fibrillation and morbid obesity.  He follows up in the office today after his most recent visit in 2020.  At that time he was here in normal sinus rhythm.  He is now back in atrial fibrillation although he does not feel it.  The patient does not have any significant complaints such as palpitations, shortness of breath, lightheadedness, dizziness, orthopnea nor paroxysmal nocturnal dyspnea.  He denies any fatigue issues.    Past Medical History:   Diagnosis Date   • Benign essential hypertension    • CHF (congestive heart failure) (CMS/HCC)    • Eye exam, routine    • H/O Leukocytosis    • New onset atrial fibrillation (CMS/HCC) 10/03/2018   • Osteoarthritis    • Peripheral edema    • Psoriasis          History reviewed. No pertinent surgical history.        Current Outpatient Medications:   •  Acetaminophen (TYLENOL ARTHRITIS PAIN PO), Take  by mouth 2 (Two) Times a Day., Disp: , Rfl:   •  allopurinol (ZYLOPRIM) 100 MG tablet, Take 2 tablets by mouth Daily., Disp: , Rfl:   •  allopurinol (ZYLOPRIM) 300 MG tablet, Take  by mouth Daily., Disp: , Rfl:   •  calcipotriene (DOVONEX) 0.005 % cream, Apply  topically to the appropriate area as directed Daily., Disp: 120 g, Rfl: 5  •  clobetasol (TEMOVATE) 0.05 % cream, Apply  topically to the appropriate area as directed Daily., Disp: 60 g, Rfl: 5  •   Clobetasol Propionate E 0.05 % emollient cream, , Disp: , Rfl:   •  colchicine 0.6 MG tablet, Take 1 tablet by mouth Daily., Disp: , Rfl:   •  diclofenac (VOLTAREN) 1 % gel gel, apply to affected area four times a day, Disp: 500 g, Rfl: 5  •  fluticasone (CUTIVATE) 0.05 % cream, Apply  topically to the appropriate area as directed Daily., Disp: 60 g, Rfl: 5  •  furosemide (LASIX) 40 MG tablet, Take 1 tablet by mouth Daily., Disp: 90 tablet, Rfl: 1  •  lisinopril-hydrochlorothiazide (PRINZIDE,ZESTORETIC) 20-12.5 MG per tablet, Take 2 tablets by mouth Daily., Disp: 180 tablet, Rfl: 1  •  metoprolol succinate XL (TOPROL-XL) 50 MG 24 hr tablet, TAKE 1 TABLET BY MOUTH EVERY DAY, Disp: 90 tablet, Rfl: 1  •  Multiple Vitamins-Minerals (MULTIVITAMIN ADULT PO), Take  by mouth Daily., Disp: , Rfl:   •  nisoldipine (SULAR) 17 MG 24 hr tablet, TAKE 1 TABLET BY MOUTH DAILY, Disp: , Rfl:   •  nisoldipine (SULAR) 34 MG 24 hr tablet, Take 1 tablet by mouth Daily., Disp: 90 tablet, Rfl: 1  •  Thiamine HCl (VITAMIN B-1) 50 MG tablet, Take 50 mg by mouth daily., Disp: , Rfl:   •  apixaban (ELIQUIS) 5 MG tablet tablet, Take 1 tablet by mouth Every 12 (Twelve) Hours., Disp: 60 tablet, Rfl: 3      Social History     Socioeconomic History   • Marital status:      Spouse name: Not on file   • Number of children: Not on file   • Years of education: High school   • Highest education level: Not on file   Tobacco Use   • Smoking status: Former Smoker     Packs/day: 1.00     Years: 10.00     Pack years: 10.00     Types: Cigarettes   • Smokeless tobacco: Never Used   • Tobacco comment: Caffeine use   Substance and Sexual Activity   • Alcohol use: Yes     Comment: occ   • Drug use: No   • Sexual activity: Defer         Review of Systems   HENT: Negative.    Eyes: Negative.    Cardiovascular: Negative.    Respiratory: Negative.    Endocrine: Negative.    Skin: Negative.    Musculoskeletal: Negative.    Gastrointestinal: Negative.   "  Neurological: Negative.    Psychiatric/Behavioral: Negative.        Procedures      ECG 12 Lead    Date/Time: 4/8/2021 2:45 PM  Performed by: Az Moreno MD  Authorized by: Az Moreno MD   Comparison: compared with previous ECG from 10/8/2020  Comparison to previous ECG: Atrial fibrillation flutter is new.  Rhythm: atrial flutter and atrial fibrillation  Rate: normal  Conduction: conduction normal  QRS axis: normal                  Objective:    /100 (BP Location: Left arm, Patient Position: Sitting, Cuff Size: Adult)   Pulse 57   Ht 172.7 cm (68\")   Wt (!) 145 kg (319 lb)   SpO2 99%   BMI 48.50 kg/m²         Constitutional:       Appearance: Well-developed. Morbidly obese. Chronically ill-appearing.   Neck:      Vascular: JVD normal.   Pulmonary:      Effort: Pulmonary effort is normal.      Breath sounds: Normal breath sounds.   Cardiovascular:      Normal rate. Irregularly irregular rhythm. distant S1. distant S2.      Murmurs: There is no murmur.      No gallop. No click. No rub.   Edema:     Peripheral edema present.     Ankle: bilateral 2+ edema of the ankle.     Feet: bilateral 2+ edema of the feet.  Abdominal:      General: Abdomen is protuberant.      Palpations: There is no hepatomegaly.   Skin:     Comments: Multiple areas of psoriasis throughout.   Neurological:      Mental Status: Alert.   Psychiatric:         Behavior: Behavior is cooperative.             Assessment:       Diagnosis Plan   1. Paroxysmal atrial fibrillation (CMS/HCC)     2. Benign essential hypertension     3. Peripheral edema  Comprehensive Metabolic Panel    CBC & Differential   4. Morbid obesity with BMI of 45.0-49.9, adult (CMS/HCC)     5. Stage 3 chronic kidney disease, unspecified whether stage 3a or 3b CKD (CMS/HCC)         1.  Paroxysmal atrial fibrillation.  The patient is now back in atrial fibrillation.  I reviewed with him anticoagulants.  He has not had any lab work since 2018.  His last " creatinine was elevated.  I am going to have him repeat his lab work to place him on apixaban.  He had previously been on Xarelto.  2.  Hypertension: The patient's blood pressure is uncontrolled here today.  I am hopeful after getting his lab work back I will be able to make adjustments in his medication to bring his pressure down  3.  Peripheral edema: Secondary to morbid obesity  4.  Morbid obesity  5.  Chronic kidney disease: No blood reevaluation in several years.  We will recheck today.     Plan:   1.  Apixaban 5 mg twice daily until I see his laboratory work  2.  Follow-up 1 month  3.  Reevaluate hypertension    The likelihood that we will be able to convert him out of atrial flutter/fibrillation is extremely low.

## 2021-05-21 RX ORDER — METOPROLOL SUCCINATE 50 MG/1
TABLET, EXTENDED RELEASE ORAL
Qty: 90 TABLET | Refills: 1 | Status: SHIPPED | OUTPATIENT
Start: 2021-05-21 | End: 2021-11-11

## 2021-06-23 ENCOUNTER — OFFICE VISIT (OUTPATIENT)
Dept: CARDIOLOGY | Facility: CLINIC | Age: 71
End: 2021-06-23

## 2021-06-23 VITALS
WEIGHT: 315 LBS | HEART RATE: 88 BPM | HEIGHT: 68 IN | BODY MASS INDEX: 47.74 KG/M2 | DIASTOLIC BLOOD PRESSURE: 90 MMHG | SYSTOLIC BLOOD PRESSURE: 130 MMHG | OXYGEN SATURATION: 97 %

## 2021-06-23 DIAGNOSIS — I10 BENIGN ESSENTIAL HYPERTENSION: ICD-10-CM

## 2021-06-23 DIAGNOSIS — I48.0 PAROXYSMAL ATRIAL FIBRILLATION (HCC): Primary | ICD-10-CM

## 2021-06-23 PROCEDURE — 93000 ELECTROCARDIOGRAM COMPLETE: CPT | Performed by: INTERNAL MEDICINE

## 2021-06-23 PROCEDURE — 99213 OFFICE O/P EST LOW 20 MIN: CPT | Performed by: INTERNAL MEDICINE

## 2021-06-23 RX ORDER — COLCHICINE 0.6 MG/1
1 CAPSULE ORAL DAILY
COMMUNITY
Start: 2021-06-07

## 2021-06-23 NOTE — PROGRESS NOTES
Date of Office Visit: 2021    Patient Name: Ganesh Viera  : 1950    Encounter Provider: Az Moreno MD  Referring Provider: No ref. provider found  Place of Service: [facility] CARDIOLOGY  Patient Care Team:  Stacia Parikh APRN as PCP - General (Family Medicine)  Ganesh Cole MD as Consulting Physician (Hematology and Oncology)      Chief Complaint   Patient presents with   • Paroxysmal atrial fibrillation   • Follow-up     History of Present Illness  The patient is a 70-year-old white male with a history of atrial fibrillation who returns to the office today.  When he was seen here in April of this year he was back in atrial fibrillation but did not realize it.  He is generally asymptomatic.  He also has a history of hypertension and after adjustments in medication his blood pressure now is much better.  The patient is also back in sinus rhythm.      Past Medical History:   Diagnosis Date   • Benign essential hypertension    • CHF (congestive heart failure) (CMS/HCC)    • Eye exam, routine    • H/O Leukocytosis    • New onset atrial fibrillation (CMS/HCC) 10/03/2018   • Osteoarthritis    • Peripheral edema    • Psoriasis          History reviewed. No pertinent surgical history.        Current Outpatient Medications:   •  Acetaminophen (TYLENOL ARTHRITIS PAIN PO), Take  by mouth 2 (Two) Times a Day., Disp: , Rfl:   •  allopurinol (ZYLOPRIM) 100 MG tablet, Take 2 tablets by mouth Daily., Disp: , Rfl:   •  allopurinol (ZYLOPRIM) 300 MG tablet, Take  by mouth Daily., Disp: , Rfl:   •  apixaban (ELIQUIS) 5 MG tablet tablet, Take 1 tablet by mouth Every 12 (Twelve) Hours., Disp: 60 tablet, Rfl: 3  •  calcipotriene (DOVONEX) 0.005 % cream, Apply  topically to the appropriate area as directed Daily., Disp: 120 g, Rfl: 5  •  clobetasol (TEMOVATE) 0.05 % cream, Apply  topically to the appropriate area as directed Daily., Disp: 60 g, Rfl:  5  •  Clobetasol Propionate E 0.05 % emollient cream, , Disp: , Rfl:   •  fluticasone (CUTIVATE) 0.05 % cream, Apply  topically to the appropriate area as directed Daily., Disp: 60 g, Rfl: 5  •  furosemide (LASIX) 40 MG tablet, Take 1 tablet by mouth Daily., Disp: 90 tablet, Rfl: 1  •  lisinopril-hydrochlorothiazide (PRINZIDE,ZESTORETIC) 20-12.5 MG per tablet, Take 2 tablets by mouth Daily., Disp: 180 tablet, Rfl: 1  •  metoprolol succinate XL (TOPROL-XL) 50 MG 24 hr tablet, TAKE 1 TABLET BY MOUTH DAILY, Disp: 90 tablet, Rfl: 1  •  Mitigare 0.6 MG capsule capsule, Take 1 capsule by mouth Daily., Disp: , Rfl:   •  Multiple Vitamins-Minerals (MULTIVITAMIN ADULT PO), Take  by mouth Daily., Disp: , Rfl:   •  nisoldipine (SULAR) 34 MG 24 hr tablet, Take 1 tablet by mouth Daily., Disp: 90 tablet, Rfl: 1  •  Thiamine HCl (VITAMIN B-1) 50 MG tablet, Take 50 mg by mouth daily., Disp: , Rfl:   •  colchicine 0.6 MG tablet, Take 1 tablet by mouth Daily., Disp: , Rfl:   •  diclofenac (VOLTAREN) 1 % gel gel, apply to affected area four times a day, Disp: 500 g, Rfl: 5  •  nisoldipine (SULAR) 17 MG 24 hr tablet, TAKE 1 TABLET BY MOUTH DAILY, Disp: , Rfl:       Social History     Socioeconomic History   • Marital status:      Spouse name: Not on file   • Number of children: Not on file   • Years of education: High school   • Highest education level: Not on file   Tobacco Use   • Smoking status: Former Smoker     Packs/day: 1.00     Years: 10.00     Pack years: 10.00     Types: Cigarettes   • Smokeless tobacco: Never Used   • Tobacco comment: Caffeine use   Substance and Sexual Activity   • Alcohol use: Yes     Comment: occ   • Drug use: No   • Sexual activity: Defer         Review of Systems   Constitutional: Negative.   HENT: Negative.    Eyes: Negative.    Cardiovascular: Negative.    Respiratory: Negative.    Endocrine: Negative.    Skin: Negative.    Musculoskeletal: Negative.    Gastrointestinal: Negative.   "  Neurological: Negative.    Psychiatric/Behavioral: Negative.        Procedures      ECG 12 Lead    Date/Time: 6/23/2021 9:18 AM  Performed by: Az Moreno MD  Authorized by: Az Moreno MD   Comparison: compared with previous ECG from 4/8/2021  Comparison to previous ECG: Atrial fibrillation no longer present  Rhythm: sinus rhythm  Ectopy: unifocal PVCs  Rate: normal  Conduction: conduction normal  ST Segments: ST segments normal  QRS axis: normal                  Objective:    /90 (BP Location: Left arm, Patient Position: Sitting)   Pulse 88   Ht 172.7 cm (68\")   Wt (!) 145 kg (319 lb)   SpO2 97%   BMI 48.50 kg/m²         Vitals reviewed.   Constitutional:       Appearance: Well-developed. Morbidly obese.   Eyes:      Pupils: Pupils are equal, round, and reactive to light.   HENT:      Head: Normocephalic.   Neck:      Thyroid: No thyromegaly.      Vascular: No carotid bruit or JVD.   Pulmonary:      Effort: Pulmonary effort is normal.      Breath sounds: Normal breath sounds.   Cardiovascular:      Normal rate. Regular rhythm. Normal S1. Normal S2.      No gallop.   Pulses:     Intact distal pulses.   Edema:     Peripheral edema absent.   Abdominal:      General: Bowel sounds are normal.      Palpations: Abdomen is soft.   Musculoskeletal:      Cervical back: Normal range of motion. Skin:     General: Skin is warm and dry.      Findings: No erythema.   Neurological:      Mental Status: Alert and oriented to person, place, and time.             Assessment:       Diagnosis Plan   1. Paroxysmal atrial fibrillation (CMS/HCC)     2. Benign essential hypertension       1.  Paroxysmal atrial fibrillation: No reliable way short of an internal monitor to see whether the patient is in sinus rhythm or atrial fibrillation.  He is asymptomatic with the events.  At this point we are going to continue his apixaban indefinitely.  He is presently not having any bleeding issues.  We will withhold any " internal loop recorder and less there is a change in his symptoms or there is a need to change his medication.  2.  Hypertension: Improved  3.  Morbid obesity: Major health risk       Plan:

## 2021-08-01 DIAGNOSIS — I10 BENIGN ESSENTIAL HYPERTENSION: ICD-10-CM

## 2021-08-03 DIAGNOSIS — I10 BENIGN ESSENTIAL HYPERTENSION: ICD-10-CM

## 2021-08-03 RX ORDER — NISOLDIPINE 34 MG/1
34 TABLET, FILM COATED, EXTENDED RELEASE ORAL DAILY
Qty: 30 TABLET | Refills: 0 | Status: SHIPPED | OUTPATIENT
Start: 2021-08-03 | End: 2021-08-10 | Stop reason: SDUPTHER

## 2021-08-03 RX ORDER — LISINOPRIL AND HYDROCHLOROTHIAZIDE 20; 12.5 MG/1; MG/1
2 TABLET ORAL DAILY
Qty: 60 TABLET | Refills: 0 | Status: SHIPPED | OUTPATIENT
Start: 2021-08-03 | End: 2021-08-10 | Stop reason: SDUPTHER

## 2021-08-04 ENCOUNTER — TELEPHONE (OUTPATIENT)
Dept: FAMILY MEDICINE CLINIC | Facility: CLINIC | Age: 71
End: 2021-08-04

## 2021-08-04 RX ORDER — LISINOPRIL AND HYDROCHLOROTHIAZIDE 20; 12.5 MG/1; MG/1
2 TABLET ORAL DAILY
Qty: 180 TABLET | OUTPATIENT
Start: 2021-08-04

## 2021-08-04 RX ORDER — APIXABAN 5 MG/1
TABLET, FILM COATED ORAL
Qty: 60 TABLET | Refills: 3 | OUTPATIENT
Start: 2021-08-04

## 2021-08-04 RX ORDER — NISOLDIPINE 34 MG/1
34 TABLET, FILM COATED, EXTENDED RELEASE ORAL DAILY
Qty: 90 TABLET | OUTPATIENT
Start: 2021-08-04

## 2021-08-04 NOTE — TELEPHONE ENCOUNTER
PATIENT IS NEEDING A COUPLE DAYS WORTH UNTIL HIS APPOINTMENT ON AUG 10TH    nisoldipine (SULAR) 34 MG 24 hr tablet  lisinopril-hydrochlorothiazide (PRINZIDE,ZESTORETIC) 20-12.5 MG per tablet    [id number] - [facility] [address] AT [facility] [address] - [phone] [facility] [phone]

## 2021-08-10 ENCOUNTER — OFFICE VISIT (OUTPATIENT)
Dept: FAMILY MEDICINE CLINIC | Facility: CLINIC | Age: 71
End: 2021-08-10

## 2021-08-10 VITALS
WEIGHT: 314 LBS | BODY MASS INDEX: 47.59 KG/M2 | RESPIRATION RATE: 16 BRPM | HEIGHT: 68 IN | OXYGEN SATURATION: 94 % | HEART RATE: 80 BPM | TEMPERATURE: 97.3 F | DIASTOLIC BLOOD PRESSURE: 80 MMHG | SYSTOLIC BLOOD PRESSURE: 124 MMHG

## 2021-08-10 DIAGNOSIS — L40.9 PSORIASIS: ICD-10-CM

## 2021-08-10 DIAGNOSIS — R60.9 PERIPHERAL EDEMA: ICD-10-CM

## 2021-08-10 DIAGNOSIS — Z12.5 SCREENING FOR PROSTATE CANCER: ICD-10-CM

## 2021-08-10 DIAGNOSIS — I10 BENIGN ESSENTIAL HYPERTENSION: Primary | ICD-10-CM

## 2021-08-10 PROCEDURE — 99214 OFFICE O/P EST MOD 30 MIN: CPT | Performed by: NURSE PRACTITIONER

## 2021-08-10 RX ORDER — CLOBETASOL PROPIONATE 0.5 MG/G
CREAM TOPICAL DAILY
Qty: 60 G | Refills: 5 | Status: SHIPPED | OUTPATIENT
Start: 2021-08-10 | End: 2022-04-01 | Stop reason: SDUPTHER

## 2021-08-10 RX ORDER — CALCIPOTRIENE 50 UG/G
CREAM TOPICAL
Qty: 120 G | Refills: 5 | Status: SHIPPED | OUTPATIENT
Start: 2021-08-10 | End: 2022-04-01 | Stop reason: SDUPTHER

## 2021-08-10 RX ORDER — LISINOPRIL AND HYDROCHLOROTHIAZIDE 20; 12.5 MG/1; MG/1
2 TABLET ORAL DAILY
Qty: 180 TABLET | Refills: 1 | Status: SHIPPED | OUTPATIENT
Start: 2021-08-10 | End: 2022-02-21

## 2021-08-10 RX ORDER — FUROSEMIDE 40 MG/1
40 TABLET ORAL DAILY
Qty: 90 TABLET | Refills: 1 | Status: SHIPPED | OUTPATIENT
Start: 2021-08-10 | End: 2022-04-01 | Stop reason: SDUPTHER

## 2021-08-10 RX ORDER — NISOLDIPINE 34 MG/1
34 TABLET, FILM COATED, EXTENDED RELEASE ORAL DAILY
Qty: 90 TABLET | Refills: 1 | Status: SHIPPED | OUTPATIENT
Start: 2021-08-10 | End: 2022-02-21

## 2021-08-10 RX ORDER — FLUTICASONE PROPIONATE 0.05 %
CREAM (GRAM) TOPICAL DAILY
Qty: 60 G | Refills: 5 | Status: SHIPPED | OUTPATIENT
Start: 2021-08-10 | End: 2022-04-01 | Stop reason: SDUPTHER

## 2021-08-10 NOTE — PROGRESS NOTES
"Subjective   Ganesh Viera is a 70 y.o. male.     History of Present Illness    Since the last visit, he has overall felt well.  He has Essential Hypertension and well controlled on current medication, Atrial Fibillation and remains under the care of their cardiologist for management and psoriasis and needs topicals refilled.  He has gout that is well controlled on allopurinol .  he has been compliant with current medications have reviewed them.  The patient denies medication side effects.  Will refill medications. /80   Pulse 80   Temp 97.3 °F (36.3 °C)   Resp 16   Ht 172.7 cm (68\")   Wt (!) 142 kg (314 lb)   SpO2 94%   BMI 47.74 kg/m²     Results for orders placed or performed in visit on 04/08/21   Comprehensive Metabolic Panel    Specimen: Blood   Result Value Ref Range    Glucose 101 (H) 65 - 99 mg/dL    BUN 20 8 - 23 mg/dL    Creatinine 1.28 (H) 0.76 - 1.27 mg/dL    Sodium 140 136 - 145 mmol/L    Potassium 4.2 3.5 - 5.2 mmol/L    Chloride 100 98 - 107 mmol/L    CO2 27.1 22.0 - 29.0 mmol/L    Calcium 9.4 8.6 - 10.5 mg/dL    Total Protein 7.5 6.0 - 8.5 g/dL    Albumin 4.30 3.50 - 5.20 g/dL    ALT (SGPT) 22 1 - 41 U/L    AST (SGOT) 27 1 - 40 U/L    Alkaline Phosphatase 109 39 - 117 U/L    Total Bilirubin 0.4 0.0 - 1.2 mg/dL    eGFR Non African Amer 56 (L) >60 mL/min/1.73    Globulin 3.2 gm/dL    A/G Ratio 1.3 g/dL    BUN/Creatinine Ratio 15.6 7.0 - 25.0    Anion Gap 12.9 5.0 - 15.0 mmol/L   CBC Auto Differential    Specimen: Blood   Result Value Ref Range    WBC 11.80 (H) 3.40 - 10.80 10*3/mm3    RBC 5.30 4.14 - 5.80 10*6/mm3    Hemoglobin 16.4 13.0 - 17.7 g/dL    Hematocrit 46.5 37.5 - 51.0 %    MCV 87.7 79.0 - 97.0 fL    MCH 30.9 26.6 - 33.0 pg    MCHC 35.3 31.5 - 35.7 g/dL    RDW 13.7 12.3 - 15.4 %    RDW-SD 43.1 37.0 - 54.0 fl    MPV 11.3 6.0 - 12.0 fL    Platelets 247 140 - 450 10*3/mm3    Neutrophil % 74.9 42.7 - 76.0 %    Lymphocyte % 12.7 (L) 19.6 - 45.3 %    Monocyte % 9.4 5.0 - 12.0 %    " Eosinophil % 1.7 0.3 - 6.2 %    Basophil % 0.6 0.0 - 1.5 %    Immature Grans % 0.7 (H) 0.0 - 0.5 %    Neutrophils, Absolute 8.84 (H) 1.70 - 7.00 10*3/mm3    Lymphocytes, Absolute 1.50 0.70 - 3.10 10*3/mm3    Monocytes, Absolute 1.11 (H) 0.10 - 0.90 10*3/mm3    Eosinophils, Absolute 0.20 0.00 - 0.40 10*3/mm3    Basophils, Absolute 0.07 0.00 - 0.20 10*3/mm3    Immature Grans, Absolute 0.08 (H) 0.00 - 0.05 10*3/mm3    nRBC 0.0 0.0 - 0.2 /100 WBC         The following portions of the patient's history were reviewed and updated as appropriate: allergies, current medications, past family history, past medical history, past social history, past surgical history and problem list.    Review of Systems   Constitutional: Negative for fatigue.   Respiratory: Negative for cough and shortness of breath.    Cardiovascular: Negative for chest pain and palpitations.   Endocrine: Negative for cold intolerance, heat intolerance, polydipsia, polyphagia and polyuria.   Psychiatric/Behavioral: Negative for dysphoric mood and sleep disturbance. The patient is not nervous/anxious.        Objective   Physical Exam  Vitals and nursing note reviewed.   Constitutional:       Appearance: He is well-developed.   Neck:      Vascular: No carotid bruit.   Cardiovascular:      Rate and Rhythm: Normal rate and regular rhythm.   Pulmonary:      Effort: Pulmonary effort is normal.      Breath sounds: Normal breath sounds.   Neurological:      Mental Status: He is alert and oriented to person, place, and time.   Psychiatric:         Mood and Affect: Mood normal.         Behavior: Behavior normal.         Thought Content: Thought content normal.         Judgment: Judgment normal.         Assessment/Plan   Diagnoses and all orders for this visit:    1. Benign essential hypertension (Primary)  -     lisinopril-hydrochlorothiazide (PRINZIDE,ZESTORETIC) 20-12.5 MG per tablet; Take 2 tablets by mouth Daily.  Dispense: 180 tablet; Refill: 1  -     nisoldipine  (SULAR) 34 MG 24 hr tablet; Take 1 tablet by mouth Daily.  Dispense: 90 tablet; Refill: 1  -     Comprehensive metabolic panel  -     Lipid panel  -     CBC and Differential  -     TSH  -     PSA Screen    2. Psoriasis  -     clobetasol (TEMOVATE) 0.05 % cream; Apply  topically to the appropriate area as directed Daily.  Dispense: 60 g; Refill: 5  -     calcipotriene (DOVONEX) 0.005 % cream; Apply  topically to the appropriate area as directed Daily.  Dispense: 120 g; Refill: 5  -     fluticasone (CUTIVATE) 0.05 % cream; Apply  topically to the appropriate area as directed Daily.  Dispense: 60 g; Refill: 5    3. Peripheral edema  -     furosemide (LASIX) 40 MG tablet; Take 1 tablet by mouth Daily.  Dispense: 90 tablet; Refill: 1  -     Comprehensive metabolic panel  -     Lipid panel  -     CBC and Differential  -     TSH  -     PSA Screen    4. Screening for prostate cancer  -     PSA Screen

## 2021-08-11 ENCOUNTER — PRIOR AUTHORIZATION (OUTPATIENT)
Dept: FAMILY MEDICINE CLINIC | Facility: CLINIC | Age: 71
End: 2021-08-11

## 2021-11-11 RX ORDER — METOPROLOL SUCCINATE 50 MG/1
TABLET, EXTENDED RELEASE ORAL
Qty: 90 TABLET | Refills: 1 | Status: SHIPPED | OUTPATIENT
Start: 2021-11-11 | End: 2022-09-01 | Stop reason: SDUPTHER

## 2021-11-30 RX ORDER — APIXABAN 5 MG/1
TABLET, FILM COATED ORAL
Qty: 60 TABLET | Refills: 3 | Status: SHIPPED | OUTPATIENT
Start: 2021-11-30 | End: 2022-03-22

## 2021-11-30 NOTE — TELEPHONE ENCOUNTER
Please advise filling Eliquis    LOV   -   6/23/21  Next   -   12/29/21  Last labs   -   4/8/21 CMP, CBC    Oneida OQUENDO

## 2022-02-20 DIAGNOSIS — I10 BENIGN ESSENTIAL HYPERTENSION: ICD-10-CM

## 2022-02-21 DIAGNOSIS — I10 BENIGN ESSENTIAL HYPERTENSION: ICD-10-CM

## 2022-02-21 RX ORDER — NISOLDIPINE 34 MG/1
34 TABLET, FILM COATED, EXTENDED RELEASE ORAL DAILY
Qty: 90 TABLET | OUTPATIENT
Start: 2022-02-21

## 2022-02-21 RX ORDER — LISINOPRIL AND HYDROCHLOROTHIAZIDE 20; 12.5 MG/1; MG/1
2 TABLET ORAL DAILY
Qty: 60 TABLET | Refills: 0 | Status: SHIPPED | OUTPATIENT
Start: 2022-02-21 | End: 2022-04-01 | Stop reason: SDUPTHER

## 2022-02-21 RX ORDER — LISINOPRIL AND HYDROCHLOROTHIAZIDE 20; 12.5 MG/1; MG/1
2 TABLET ORAL DAILY
Qty: 180 TABLET | OUTPATIENT
Start: 2022-02-21

## 2022-02-21 RX ORDER — NISOLDIPINE 34 MG/1
34 TABLET, FILM COATED, EXTENDED RELEASE ORAL DAILY
Qty: 30 TABLET | Refills: 0 | Status: SHIPPED | OUTPATIENT
Start: 2022-02-21 | End: 2022-04-01 | Stop reason: SDUPTHER

## 2022-02-23 ENCOUNTER — OFFICE VISIT (OUTPATIENT)
Dept: CARDIOLOGY | Facility: CLINIC | Age: 72
End: 2022-02-23

## 2022-02-23 VITALS
SYSTOLIC BLOOD PRESSURE: 136 MMHG | HEIGHT: 68 IN | OXYGEN SATURATION: 97 % | HEART RATE: 68 BPM | WEIGHT: 315 LBS | DIASTOLIC BLOOD PRESSURE: 100 MMHG | BODY MASS INDEX: 47.74 KG/M2

## 2022-02-23 DIAGNOSIS — E66.01 MORBID OBESITY WITH BMI OF 45.0-49.9, ADULT: ICD-10-CM

## 2022-02-23 DIAGNOSIS — I48.21 PERMANENT ATRIAL FIBRILLATION: Primary | ICD-10-CM

## 2022-02-23 DIAGNOSIS — I10 BENIGN ESSENTIAL HYPERTENSION: ICD-10-CM

## 2022-02-23 PROCEDURE — 99214 OFFICE O/P EST MOD 30 MIN: CPT | Performed by: INTERNAL MEDICINE

## 2022-02-23 PROCEDURE — 93000 ELECTROCARDIOGRAM COMPLETE: CPT | Performed by: INTERNAL MEDICINE

## 2022-02-23 NOTE — PROGRESS NOTES
OFFICE VISIT      Date of Office Visit: 2022    Patient Name: Ganesh Viera  : 1950    Encounter Provider: Az Moreno MD  Referring Provider: No ref. provider found  Primary Care Provider: Stacia Parikh APRN  Place of Service: [facility] CARDIOLOGY        Chief Complaint   Patient presents with   • Atrial Fibrillation   • Hypertension     History of Present Illness    The patient is a 71-year-old white male with atrial fibrillation/flutter as well as hypertension who returns to the office today for follow-up.    The patient reports that he feels well.  He is not complaining of any symptoms at the present time such as palpitations, exertional dyspnea, peripheral edema or fatigue.  He does not monitor his blood pressure regularly.  He reports however when he has it checked it is normal.  When I reviewed his blood pressure recordings in epic there are some levels that are elevated as they are here today.  I personally repeated his blood pressure check at 140/95.    The patient has what appears to be permanent atrial fibrillation flutter.  He remains anticoagulated with apixaban.  He is not having any bleeding issues.  He remains morbidly obese which is an impediment to his health.    Past Medical History:   Diagnosis Date   • Benign essential hypertension    • CHF (congestive heart failure) (HCC)    • Eye exam, routine    • H/O Leukocytosis    • New onset atrial fibrillation (HCC) 10/03/2018   • Osteoarthritis    • Peripheral edema    • Psoriasis          History reviewed. No pertinent surgical history.        Current Outpatient Medications:   •  Acetaminophen (TYLENOL ARTHRITIS PAIN PO), Take  by mouth 2 (Two) Times a Day., Disp: , Rfl:   •  allopurinol (ZYLOPRIM) 100 MG tablet, Take 1 tablet by mouth Daily., Disp: , Rfl:   •  allopurinol (ZYLOPRIM) 300 MG tablet, Take  by mouth Daily., Disp: , Rfl:   •  calcipotriene (DOVONEX) 0.005 %  cream, Apply  topically to the appropriate area as directed Daily., Disp: 120 g, Rfl: 5  •  clobetasol (TEMOVATE) 0.05 % cream, Apply  topically to the appropriate area as directed Daily., Disp: 60 g, Rfl: 5  •  Clobetasol Propionate E 0.05 % emollient cream, , Disp: , Rfl:   •  colchicine 0.6 MG tablet, Take 1 tablet by mouth Daily., Disp: , Rfl:   •  Eliquis 5 MG tablet tablet, TAKE 1 TABLET BY MOUTH EVERY 12 HOURS, Disp: 60 tablet, Rfl: 3  •  fluticasone (CUTIVATE) 0.05 % cream, Apply  topically to the appropriate area as directed Daily., Disp: 60 g, Rfl: 5  •  furosemide (LASIX) 40 MG tablet, Take 1 tablet by mouth Daily., Disp: 90 tablet, Rfl: 1  •  lisinopril-hydrochlorothiazide (PRINZIDE,ZESTORETIC) 20-12.5 MG per tablet, TAKE 2 TABLETS BY MOUTH DAILY, Disp: 60 tablet, Rfl: 0  •  metoprolol succinate XL (TOPROL-XL) 50 MG 24 hr tablet, TAKE 1 TABLET BY MOUTH EVERY DAY, Disp: 90 tablet, Rfl: 1  •  Mitigare 0.6 MG capsule capsule, Take 1 capsule by mouth Daily., Disp: , Rfl:   •  Multiple Vitamins-Minerals (MULTIVITAMIN ADULT PO), Take  by mouth Daily., Disp: , Rfl:   •  nisoldipine (SULAR) 34 MG 24 hr tablet, TAKE 1 TABLET BY MOUTH DAILY, Disp: 30 tablet, Rfl: 0  •  Thiamine HCl (VITAMIN B-1) 50 MG tablet, Take 50 mg by mouth daily., Disp: , Rfl:   •  diclofenac (VOLTAREN) 1 % gel gel, apply to affected area four times a day, Disp: 500 g, Rfl: 5  •  nisoldipine (SULAR) 17 MG 24 hr tablet, TAKE 1 TABLET BY MOUTH DAILY, Disp: , Rfl:       Social History     Socioeconomic History   • Marital status:    • Years of education: High school   Tobacco Use   • Smoking status: Former Smoker     Packs/day: 1.00     Years: 10.00     Pack years: 10.00     Types: Cigarettes   • Smokeless tobacco: Never Used   • Tobacco comment: Caffeine use   Substance and Sexual Activity   • Alcohol use: Yes     Comment: occ   • Drug use: No   • Sexual activity: Defer         Review of Systems   Constitutional: Negative.   HENT:  "Negative.    Eyes: Negative.    Cardiovascular: Negative.    Respiratory: Negative.    Endocrine: Negative.    Skin: Negative.    Musculoskeletal: Negative.    Gastrointestinal: Negative.    Neurological: Negative.    Psychiatric/Behavioral: Negative.        Procedures      ECG 12 Lead    Date/Time: 2/23/2022 9:32 AM  Performed by: Az Moreno MD  Authorized by: Az Moreno MD   Comparison: compared with previous ECG from 6/23/2021  Comparison to previous ECG: The patient is no longer in sinus rhythm  Rhythm: atrial flutter and atrial fibrillation  Rate: normal  Conduction: conduction normal  QRS axis: left  Other findings: low voltage                Objective:    /100 (BP Location: Left arm, Patient Position: Sitting)   Pulse 68   Ht 172.7 cm (68\")   Wt (!) 145 kg (320 lb 3.2 oz)   SpO2 97%   BMI 48.69 kg/m²         Vitals reviewed.   Constitutional:       Appearance: Healthy appearance. Well-developed and not in distress. Morbidly obese.   HENT:      Head: Normocephalic.   Neck:      Thyroid: No thyromegaly.      Vascular: No carotid bruit or JVD.   Pulmonary:      Effort: Pulmonary effort is normal.      Breath sounds: Normal breath sounds.   Cardiovascular:      Normal rate. Irregularly irregular rhythm. Normal S1. Normal S2.      Murmurs: There is no murmur.      No gallop. No click. No rub.   Pulses:     Intact distal pulses.   Edema:     Peripheral edema present.     Ankle: bilateral 1+ edema of the ankle.     Feet: bilateral 1+ edema of the feet.  Musculoskeletal:      Cervical back: Normal range of motion. Skin:     General: Skin is warm and dry.      Findings: No erythema.   Neurological:      Mental Status: Alert and oriented to person, place, and time.             Assessment & Plan:       Diagnosis Plan   1. Permanent atrial fibrillation (HCC)     2. Benign essential hypertension     3. Morbid obesity with BMI of 45.0-49.9, adult (HCC)           1.  Atrial fibrillation/atrial " flutter: This appears to be permanent.  He remains on apixaban for anticoagulation.  Chads 2 vas score is 3.  2.  Hypertension: I do not think his pressure is is adequately controlled as he believes.  I have asked him to get a blood pressure cuff and start recording his blood pressure regularly.  I suspect he will need adjustment in his medication.  I did impress upon him the importance of controlling blood pressure due to the potential risk of stroke, heart failure and renal failure.  3.  Congestive heart failure: By history.  This is more diastolic in nature than systolic.  Last echocardiogram showed normal left ventricular systolic function.  No significant valvular disease  4.  Morbid obesity: Impacting all levels of care.

## 2022-03-22 RX ORDER — APIXABAN 5 MG/1
TABLET, FILM COATED ORAL
Qty: 60 TABLET | Refills: 3 | Status: CANCELLED | OUTPATIENT
Start: 2022-03-22

## 2022-03-22 NOTE — TELEPHONE ENCOUNTER
Please advise filling Eliquis.      LOV   -   2/23/22  Next   -   8/23/22  Last labs   -   4/8/21    Oneida OQUENDO

## 2022-03-23 RX ORDER — APIXABAN 5 MG/1
TABLET, FILM COATED ORAL
Qty: 60 TABLET | Refills: 4 | Status: SHIPPED | OUTPATIENT
Start: 2022-03-23 | End: 2022-08-19

## 2022-04-01 ENCOUNTER — OFFICE VISIT (OUTPATIENT)
Dept: FAMILY MEDICINE CLINIC | Facility: CLINIC | Age: 72
End: 2022-04-01

## 2022-04-01 VITALS
DIASTOLIC BLOOD PRESSURE: 80 MMHG | OXYGEN SATURATION: 97 % | SYSTOLIC BLOOD PRESSURE: 130 MMHG | HEART RATE: 74 BPM | RESPIRATION RATE: 18 BRPM | TEMPERATURE: 97.8 F | HEIGHT: 68 IN | WEIGHT: 315 LBS | BODY MASS INDEX: 47.74 KG/M2

## 2022-04-01 DIAGNOSIS — L40.9 PSORIASIS: ICD-10-CM

## 2022-04-01 DIAGNOSIS — I10 BENIGN ESSENTIAL HYPERTENSION: Primary | ICD-10-CM

## 2022-04-01 DIAGNOSIS — Z12.5 SCREENING FOR PROSTATE CANCER: ICD-10-CM

## 2022-04-01 DIAGNOSIS — R60.9 PERIPHERAL EDEMA: ICD-10-CM

## 2022-04-01 PROCEDURE — 99214 OFFICE O/P EST MOD 30 MIN: CPT | Performed by: NURSE PRACTITIONER

## 2022-04-01 RX ORDER — NISOLDIPINE 34 MG/1
TABLET, FILM COATED, EXTENDED RELEASE ORAL
COMMUNITY
Start: 2022-02-21 | End: 2022-04-01

## 2022-04-01 RX ORDER — ALLOPURINOL 300 MG/1
1 TABLET ORAL DAILY
COMMUNITY
Start: 2021-12-28 | End: 2022-04-01

## 2022-04-01 RX ORDER — CALCIPOTRIENE 50 UG/G
CREAM TOPICAL
Qty: 120 G | Refills: 5 | Status: SHIPPED | OUTPATIENT
Start: 2022-04-01 | End: 2023-02-28 | Stop reason: SDUPTHER

## 2022-04-01 RX ORDER — CLOBETASOL PROPIONATE 0.5 MG/G
CREAM TOPICAL DAILY
Qty: 60 G | Refills: 5 | Status: SHIPPED | OUTPATIENT
Start: 2022-04-01 | End: 2023-02-28 | Stop reason: SDUPTHER

## 2022-04-01 RX ORDER — FUROSEMIDE 40 MG/1
40 TABLET ORAL DAILY
Qty: 90 TABLET | Refills: 1 | Status: SHIPPED | OUTPATIENT
Start: 2022-04-01 | End: 2023-02-28 | Stop reason: SDUPTHER

## 2022-04-01 RX ORDER — NISOLDIPINE 34 MG/1
34 TABLET, FILM COATED, EXTENDED RELEASE ORAL DAILY
Qty: 90 TABLET | Refills: 1 | Status: SHIPPED | OUTPATIENT
Start: 2022-04-01 | End: 2022-09-26

## 2022-04-01 RX ORDER — FLUTICASONE PROPIONATE 0.05 %
CREAM (GRAM) TOPICAL DAILY
Qty: 60 G | Refills: 5 | Status: SHIPPED | OUTPATIENT
Start: 2022-04-01 | End: 2023-02-28 | Stop reason: SDUPTHER

## 2022-04-01 RX ORDER — LISINOPRIL AND HYDROCHLOROTHIAZIDE 20; 12.5 MG/1; MG/1
2 TABLET ORAL DAILY
Qty: 180 TABLET | Refills: 1 | Status: SHIPPED | OUTPATIENT
Start: 2022-04-01 | End: 2022-09-26

## 2022-04-01 NOTE — PROGRESS NOTES
"Subjective   Ganesh Viera is a 71 y.o. male.     History of Present Illness    Since the last visit, he has overall felt well.  He has HTN that is fairly well controlled today. He does not check at home but was instructed to get BP monitor to check per his cardiologist.   He has not yet started checking his BP. he has been compliant with current medications have reviewed them.  The patient denies medication side effects.  Will refill medications. /80   Pulse 74   Temp 97.8 °F (36.6 °C)   Resp 18   Ht 172 cm (67.72\")   Wt (!) 145 kg (320 lb)   SpO2 97%   BMI 49.06 kg/m²     Results for orders placed or performed in visit on 04/08/21   Comprehensive Metabolic Panel    Specimen: Blood   Result Value Ref Range    Glucose 101 (H) 65 - 99 mg/dL    BUN 20 8 - 23 mg/dL    Creatinine 1.28 (H) 0.76 - 1.27 mg/dL    Sodium 140 136 - 145 mmol/L    Potassium 4.2 3.5 - 5.2 mmol/L    Chloride 100 98 - 107 mmol/L    CO2 27.1 22.0 - 29.0 mmol/L    Calcium 9.4 8.6 - 10.5 mg/dL    Total Protein 7.5 6.0 - 8.5 g/dL    Albumin 4.30 3.50 - 5.20 g/dL    ALT (SGPT) 22 1 - 41 U/L    AST (SGOT) 27 1 - 40 U/L    Alkaline Phosphatase 109 39 - 117 U/L    Total Bilirubin 0.4 0.0 - 1.2 mg/dL    eGFR Non African Amer 56 (L) >60 mL/min/1.73    Globulin 3.2 gm/dL    A/G Ratio 1.3 g/dL    BUN/Creatinine Ratio 15.6 7.0 - 25.0    Anion Gap 12.9 5.0 - 15.0 mmol/L   CBC Auto Differential    Specimen: Blood   Result Value Ref Range    WBC 11.80 (H) 3.40 - 10.80 10*3/mm3    RBC 5.30 4.14 - 5.80 10*6/mm3    Hemoglobin 16.4 13.0 - 17.7 g/dL    Hematocrit 46.5 37.5 - 51.0 %    MCV 87.7 79.0 - 97.0 fL    MCH 30.9 26.6 - 33.0 pg    MCHC 35.3 31.5 - 35.7 g/dL    RDW 13.7 12.3 - 15.4 %    RDW-SD 43.1 37.0 - 54.0 fl    MPV 11.3 6.0 - 12.0 fL    Platelets 247 140 - 450 10*3/mm3    Neutrophil % 74.9 42.7 - 76.0 %    Lymphocyte % 12.7 (L) 19.6 - 45.3 %    Monocyte % 9.4 5.0 - 12.0 %    Eosinophil % 1.7 0.3 - 6.2 %    Basophil % 0.6 0.0 - 1.5 %    " Immature Grans % 0.7 (H) 0.0 - 0.5 %    Neutrophils, Absolute 8.84 (H) 1.70 - 7.00 10*3/mm3    Lymphocytes, Absolute 1.50 0.70 - 3.10 10*3/mm3    Monocytes, Absolute 1.11 (H) 0.10 - 0.90 10*3/mm3    Eosinophils, Absolute 0.20 0.00 - 0.40 10*3/mm3    Basophils, Absolute 0.07 0.00 - 0.20 10*3/mm3    Immature Grans, Absolute 0.08 (H) 0.00 - 0.05 10*3/mm3    nRBC 0.0 0.0 - 0.2 /100 WBC       He takes furosemide for edema.      He is also needing refill on his topical steroid regimen for psoriasis.   The following portions of the patient's history were reviewed and updated as appropriate: allergies, current medications, past family history, past medical history, past social history, past surgical history and problem list.    Review of Systems   Constitutional: Negative for fatigue.   Respiratory: Negative for cough and shortness of breath.    Cardiovascular: Negative for chest pain and palpitations.   Endocrine: Negative for cold intolerance, heat intolerance, polydipsia, polyphagia and polyuria.   Skin: Negative for rash.   Psychiatric/Behavioral: Negative for dysphoric mood and sleep disturbance. The patient is not nervous/anxious.        Objective   Physical Exam  Vitals and nursing note reviewed.   Constitutional:       Appearance: He is well-developed.   Neck:      Vascular: No carotid bruit.   Cardiovascular:      Rate and Rhythm: Normal rate and regular rhythm.   Pulmonary:      Effort: Pulmonary effort is normal.      Breath sounds: Normal breath sounds.   Neurological:      Mental Status: He is alert and oriented to person, place, and time.   Psychiatric:         Mood and Affect: Mood normal.         Behavior: Behavior normal.         Thought Content: Thought content normal.         Judgment: Judgment normal.         Assessment/Plan   Diagnoses and all orders for this visit:    1. Benign essential hypertension (Primary)  -     nisoldipine (SULAR) 34 MG 24 hr tablet; Take 1 tablet by mouth Daily.  Dispense: 90  tablet; Refill: 1  -     lisinopril-hydrochlorothiazide (PRINZIDE,ZESTORETIC) 20-12.5 MG per tablet; Take 2 tablets by mouth Daily.  Dispense: 180 tablet; Refill: 1  -     Comprehensive metabolic panel  -     Lipid panel  -     CBC and Differential  -     TSH  -     PSA Screen    2. Peripheral edema  -     furosemide (LASIX) 40 MG tablet; Take 1 tablet by mouth Daily.  Dispense: 90 tablet; Refill: 1  -     Comprehensive metabolic panel  -     Lipid panel  -     CBC and Differential  -     TSH  -     PSA Screen    3. Psoriasis  -     fluticasone (CUTIVATE) 0.05 % cream; Apply  topically to the appropriate area as directed Daily.  Dispense: 60 g; Refill: 5  -     clobetasol (TEMOVATE) 0.05 % cream; Apply  topically to the appropriate area as directed Daily.  Dispense: 60 g; Refill: 5  -     calcipotriene (DOVONEX) 0.005 % cream; Apply  topically to the appropriate area as directed Daily.  Dispense: 120 g; Refill: 5    4. Screening for prostate cancer  -     PSA Screen

## 2022-04-05 ENCOUNTER — TELEPHONE (OUTPATIENT)
Dept: FAMILY MEDICINE CLINIC | Facility: CLINIC | Age: 72
End: 2022-04-05

## 2022-04-06 NOTE — TELEPHONE ENCOUNTER
PA Case: [id number], Status: Approved, Coverage Starts on: 1/1/2022 12:00:00 AM, Coverage Ends on: 12/31/2022 12:00:00 AM. Questions? Contact [phone].

## 2022-08-19 RX ORDER — APIXABAN 5 MG/1
TABLET, FILM COATED ORAL
Qty: 60 TABLET | Refills: 4 | Status: SHIPPED | OUTPATIENT
Start: 2022-08-19 | End: 2022-09-01 | Stop reason: SDUPTHER

## 2022-08-19 NOTE — TELEPHONE ENCOUNTER
Please advise filling Eliquis.    LOV   -   2/23/2022  RL  Next   -   8/23/2022  KN  Last labs  -   4/8/2021  CMP, CBC    Oneida OQUENDO

## 2022-08-31 PROBLEM — E78.5 HYPERLIPIDEMIA LDL GOAL <100: Status: ACTIVE | Noted: 2022-08-31

## 2022-08-31 PROBLEM — E66.01 MORBID OBESITY WITH BMI OF 45.0-49.9, ADULT: Status: ACTIVE | Noted: 2022-08-31

## 2022-09-01 ENCOUNTER — OFFICE VISIT (OUTPATIENT)
Dept: CARDIOLOGY | Facility: CLINIC | Age: 72
End: 2022-09-01

## 2022-09-01 VITALS
HEART RATE: 67 BPM | BODY MASS INDEX: 47.74 KG/M2 | SYSTOLIC BLOOD PRESSURE: 140 MMHG | HEIGHT: 68 IN | WEIGHT: 315 LBS | DIASTOLIC BLOOD PRESSURE: 88 MMHG

## 2022-09-01 DIAGNOSIS — E66.01 MORBID OBESITY WITH BMI OF 45.0-49.9, ADULT: ICD-10-CM

## 2022-09-01 DIAGNOSIS — I10 BENIGN ESSENTIAL HYPERTENSION: ICD-10-CM

## 2022-09-01 DIAGNOSIS — I48.21 PERMANENT ATRIAL FIBRILLATION: Primary | ICD-10-CM

## 2022-09-01 DIAGNOSIS — E78.5 HYPERLIPIDEMIA LDL GOAL <100: ICD-10-CM

## 2022-09-01 PROCEDURE — 93000 ELECTROCARDIOGRAM COMPLETE: CPT | Performed by: NURSE PRACTITIONER

## 2022-09-01 PROCEDURE — 99214 OFFICE O/P EST MOD 30 MIN: CPT | Performed by: NURSE PRACTITIONER

## 2022-09-01 RX ORDER — METOPROLOL SUCCINATE 50 MG/1
50 TABLET, EXTENDED RELEASE ORAL DAILY
Qty: 90 TABLET | Refills: 3 | Status: SHIPPED | OUTPATIENT
Start: 2022-09-01 | End: 2022-10-24

## 2022-09-24 DIAGNOSIS — I10 BENIGN ESSENTIAL HYPERTENSION: ICD-10-CM

## 2022-09-26 RX ORDER — LISINOPRIL AND HYDROCHLOROTHIAZIDE 20; 12.5 MG/1; MG/1
2 TABLET ORAL DAILY
Qty: 60 TABLET | Refills: 0 | Status: SHIPPED | OUTPATIENT
Start: 2022-09-26 | End: 2022-10-25

## 2022-09-26 RX ORDER — NISOLDIPINE 34 MG/1
34 TABLET, FILM COATED, EXTENDED RELEASE ORAL DAILY
Qty: 30 TABLET | Refills: 0 | Status: SHIPPED | OUTPATIENT
Start: 2022-09-26 | End: 2022-10-25

## 2022-10-24 DIAGNOSIS — I10 BENIGN ESSENTIAL HYPERTENSION: ICD-10-CM

## 2022-10-24 RX ORDER — METOPROLOL SUCCINATE 50 MG/1
50 TABLET, EXTENDED RELEASE ORAL DAILY
Qty: 90 TABLET | Refills: 1 | Status: SHIPPED | OUTPATIENT
Start: 2022-10-24

## 2022-10-25 DIAGNOSIS — I10 BENIGN ESSENTIAL HYPERTENSION: ICD-10-CM

## 2022-10-25 RX ORDER — LISINOPRIL AND HYDROCHLOROTHIAZIDE 20; 12.5 MG/1; MG/1
2 TABLET ORAL DAILY
Qty: 180 TABLET | OUTPATIENT
Start: 2022-10-25

## 2022-10-25 RX ORDER — LISINOPRIL AND HYDROCHLOROTHIAZIDE 20; 12.5 MG/1; MG/1
2 TABLET ORAL DAILY
Qty: 60 TABLET | Refills: 0 | Status: SHIPPED | OUTPATIENT
Start: 2022-10-25 | End: 2022-11-28

## 2022-10-25 RX ORDER — NISOLDIPINE 34 MG/1
34 TABLET, FILM COATED, EXTENDED RELEASE ORAL DAILY
Qty: 30 TABLET | Refills: 0 | Status: SHIPPED | OUTPATIENT
Start: 2022-10-25 | End: 2022-11-28

## 2022-10-25 RX ORDER — NISOLDIPINE 34 MG/1
34 TABLET, FILM COATED, EXTENDED RELEASE ORAL DAILY
Qty: 90 TABLET | OUTPATIENT
Start: 2022-10-25

## 2022-10-25 NOTE — TELEPHONE ENCOUNTER
Rx Refill Note  Requested Prescriptions     Pending Prescriptions Disp Refills   • lisinopril-hydrochlorothiazide (PRINZIDE,ZESTORETIC) 20-12.5 MG per tablet [Pharmacy Med Name: LISINOPRIL-HCTZ 20/12.5MG TABLETS] 180 tablet      Sig: TAKE 2 TABLETS BY MOUTH DAILY   • nisoldipine (SULAR) 34 MG 24 hr tablet [Pharmacy Med Name: NISOLDIPINE ER 34MG TABLETS] 90 tablet      Sig: TAKE 1 TABLET BY MOUTH DAILY      Last office visit with prescribing clinician: 4/1/2022      Next office visit with prescribing clinician: Visit date not found     Sent a 30 day RX. Pt is needing an appointment.     Okay for the HUB to relay message

## 2022-11-23 DIAGNOSIS — I10 BENIGN ESSENTIAL HYPERTENSION: ICD-10-CM

## 2022-11-28 DIAGNOSIS — I10 BENIGN ESSENTIAL HYPERTENSION: ICD-10-CM

## 2022-11-28 RX ORDER — LISINOPRIL AND HYDROCHLOROTHIAZIDE 20; 12.5 MG/1; MG/1
2 TABLET ORAL DAILY
Qty: 60 TABLET | Refills: 0 | Status: SHIPPED | OUTPATIENT
Start: 2022-11-28 | End: 2022-11-28

## 2022-11-28 RX ORDER — LISINOPRIL AND HYDROCHLOROTHIAZIDE 20; 12.5 MG/1; MG/1
2 TABLET ORAL DAILY
Qty: 180 TABLET | OUTPATIENT
Start: 2022-11-28

## 2022-11-28 RX ORDER — NISOLDIPINE 34 MG/1
34 TABLET, FILM COATED, EXTENDED RELEASE ORAL DAILY
Qty: 30 TABLET | Refills: 0 | Status: SHIPPED | OUTPATIENT
Start: 2022-11-28 | End: 2022-11-28

## 2022-11-28 RX ORDER — NISOLDIPINE 34 MG/1
34 TABLET, FILM COATED, EXTENDED RELEASE ORAL DAILY
Qty: 90 TABLET | OUTPATIENT
Start: 2022-11-28

## 2022-11-28 RX ORDER — NISOLDIPINE 34 MG/1
34 TABLET, FILM COATED, EXTENDED RELEASE ORAL DAILY
Qty: 30 TABLET | Refills: 0 | Status: SHIPPED | OUTPATIENT
Start: 2022-11-28 | End: 2022-12-29

## 2022-11-28 RX ORDER — LISINOPRIL AND HYDROCHLOROTHIAZIDE 20; 12.5 MG/1; MG/1
2 TABLET ORAL DAILY
Qty: 60 TABLET | Refills: 0 | Status: SHIPPED | OUTPATIENT
Start: 2022-11-28 | End: 2022-12-29

## 2022-11-28 NOTE — TELEPHONE ENCOUNTER
Requested Prescriptions     Pending Prescriptions Disp Refills   • lisinopril-hydrochlorothiazide (PRINZIDE,ZESTORETIC) 20-12.5 MG per tablet [Pharmacy Med Name: LISINOPRIL-HCTZ 20/12.5MG TABLETS] 180 tablet      Sig: TAKE 2 TABLETS BY MOUTH DAILY   • nisoldipine (SULAR) 34 MG 24 hr tablet [Pharmacy Med Name: NISOLDIPINE ER 34MG TABLETS] 90 tablet      Sig: TAKE 1 TABLET BY MOUTH DAILY     Sent a 30 day RX. Pt is needing an appointment.     Okay for the HUB to relay message

## 2022-12-23 DIAGNOSIS — I10 BENIGN ESSENTIAL HYPERTENSION: ICD-10-CM

## 2022-12-23 RX ORDER — NISOLDIPINE 34 MG/1
34 TABLET, FILM COATED, EXTENDED RELEASE ORAL DAILY
Qty: 30 TABLET | Refills: 0 | OUTPATIENT
Start: 2022-12-23

## 2022-12-23 RX ORDER — LISINOPRIL AND HYDROCHLOROTHIAZIDE 20; 12.5 MG/1; MG/1
2 TABLET ORAL DAILY
Qty: 60 TABLET | Refills: 0 | OUTPATIENT
Start: 2022-12-23

## 2022-12-23 NOTE — TELEPHONE ENCOUNTER
Rx Refill Note  Requested Prescriptions     Pending Prescriptions Disp Refills   • lisinopril-hydrochlorothiazide (PRINZIDE,ZESTORETIC) 20-12.5 MG per tablet [Pharmacy Med Name: LISINOPRIL-HCTZ 20/12.5MG TABLETS] 60 tablet 0     Sig: TAKE 2 TABLETS BY MOUTH DAILY   • nisoldipine (SULAR) 34 MG 24 hr tablet [Pharmacy Med Name: NISOLDIPINE ER 34MG TABLETS] 30 tablet 0     Sig: TAKE 1 TABLET BY MOUTH DAILY      Last office visit with prescribing clinician: 4/1/2022   Last telemedicine visit with prescribing clinician: Visit date not found   Next office visit with prescribing clinician: Visit date not found                         Would you like a call back once the refill request has been completed: [] Yes [] No    If the office needs to give you a call back, can they leave a voicemail: [] Yes [] No    Zully Trinidad MA  12/23/22, 10:41 EST

## 2022-12-28 DIAGNOSIS — I10 BENIGN ESSENTIAL HYPERTENSION: ICD-10-CM

## 2022-12-29 RX ORDER — NISOLDIPINE 34 MG/1
34 TABLET, FILM COATED, EXTENDED RELEASE ORAL DAILY
Qty: 30 TABLET | Refills: 0 | Status: SHIPPED | OUTPATIENT
Start: 2022-12-29 | End: 2023-02-15 | Stop reason: SDUPTHER

## 2022-12-29 RX ORDER — LISINOPRIL AND HYDROCHLOROTHIAZIDE 20; 12.5 MG/1; MG/1
2 TABLET ORAL DAILY
Qty: 60 TABLET | Refills: 0 | Status: SHIPPED | OUTPATIENT
Start: 2022-12-29 | End: 2023-02-15 | Stop reason: SDUPTHER

## 2023-01-22 DIAGNOSIS — I10 BENIGN ESSENTIAL HYPERTENSION: ICD-10-CM

## 2023-01-22 RX ORDER — LISINOPRIL AND HYDROCHLOROTHIAZIDE 20; 12.5 MG/1; MG/1
2 TABLET ORAL DAILY
Qty: 180 TABLET | OUTPATIENT
Start: 2023-01-22

## 2023-01-22 RX ORDER — NISOLDIPINE 34 MG/1
34 TABLET, FILM COATED, EXTENDED RELEASE ORAL DAILY
Qty: 90 TABLET | OUTPATIENT
Start: 2023-01-22

## 2023-02-03 DIAGNOSIS — I10 BENIGN ESSENTIAL HYPERTENSION: ICD-10-CM

## 2023-02-05 RX ORDER — NISOLDIPINE 34 MG/1
34 TABLET, FILM COATED, EXTENDED RELEASE ORAL DAILY
Qty: 30 TABLET | Refills: 0 | OUTPATIENT
Start: 2023-02-05

## 2023-02-06 NOTE — TELEPHONE ENCOUNTER
Rx Refill Note  Requested Prescriptions     Pending Prescriptions Disp Refills   • nisoldipine (SULAR) 34 MG 24 hr tablet [Pharmacy Med Name: NISOLDIPINE ER 34MG TABLETS] 30 tablet 0     Sig: TAKE 1 TABLET BY MOUTH DAILY      Last office visit with prescribing clinician: 4/1/2022   Last telemedicine visit with prescribing clinician: 2/3/2023   Next office visit with prescribing clinician: Visit date not found

## 2023-02-15 DIAGNOSIS — I10 BENIGN ESSENTIAL HYPERTENSION: ICD-10-CM

## 2023-02-15 RX ORDER — LISINOPRIL AND HYDROCHLOROTHIAZIDE 20; 12.5 MG/1; MG/1
2 TABLET ORAL DAILY
Qty: 30 TABLET | Refills: 0 | Status: SHIPPED | OUTPATIENT
Start: 2023-02-15 | End: 2023-02-28 | Stop reason: SDUPTHER

## 2023-02-15 RX ORDER — NISOLDIPINE 34 MG/1
34 TABLET, FILM COATED, EXTENDED RELEASE ORAL DAILY
Qty: 15 TABLET | Refills: 0 | Status: SHIPPED | OUTPATIENT
Start: 2023-02-15 | End: 2023-02-28 | Stop reason: SDUPTHER

## 2023-02-15 NOTE — TELEPHONE ENCOUNTER
Rx Refill Note  Requested Prescriptions     Pending Prescriptions Disp Refills   • nisoldipine (SULAR) 34 MG 24 hr tablet 15 tablet 0     Sig: Take 1 tablet by mouth Daily.   • lisinopril-hydrochlorothiazide (PRINZIDE,ZESTORETIC) 20-12.5 MG per tablet 30 tablet 0     Sig: Take 2 tablets by mouth Daily.      Last office visit with prescribing clinician: 4/1/2022   Last telemedicine visit with prescribing clinician: 2/20/2023   Next office visit with prescribing clinician: 2/20/2023                         Would you like a call back once the refill request has been completed: [] Yes [] No    If the office needs to give you a call back, can they leave a voicemail: [] Yes [] No    Boaz Nuñez MA  02/15/23, 16:23 EST      Sent a 15 day RX to pharm. No more refills without an appointment.    Okay for hub relay message

## 2023-02-15 NOTE — TELEPHONE ENCOUNTER
Caller: Maximiliano Ganesh RODRÍGUEZ    Relationship: Self    Best call back number: [phone]     Requested Prescriptions:   Requested Prescriptions     Pending Prescriptions Disp Refills   • nisoldipine (SULAR) 34 MG 24 hr tablet 30 tablet 0     Sig: Take 1 tablet by mouth Daily.   • lisinopril-hydrochlorothiazide (PRINZIDE,ZESTORETIC) 20-12.5 MG per tablet 60 tablet 0     Sig: Take 2 tablets by mouth Daily.        Pharmacy where request should be sent: [id number] [address] AT [facility] - [phone]  - [phone]      Additional details provided by patient:         Does the patient have less than a 3 day supply:  [x] Yes  [] No    Would you like a call back once the refill request has been completed: [] Yes [] No    If the office needs to give you a call back, can they leave a voicemail: [] Yes [] No    Petros Gilbert Rep   02/15/23 14:10 EST

## 2023-02-28 ENCOUNTER — OFFICE VISIT (OUTPATIENT)
Dept: FAMILY MEDICINE CLINIC | Facility: CLINIC | Age: 73
End: 2023-02-28
Payer: MEDICARE

## 2023-02-28 VITALS
OXYGEN SATURATION: 94 % | HEART RATE: 68 BPM | TEMPERATURE: 97.5 F | BODY MASS INDEX: 47.74 KG/M2 | DIASTOLIC BLOOD PRESSURE: 80 MMHG | RESPIRATION RATE: 16 BRPM | WEIGHT: 315 LBS | HEIGHT: 68 IN | SYSTOLIC BLOOD PRESSURE: 134 MMHG

## 2023-02-28 DIAGNOSIS — I10 BENIGN ESSENTIAL HYPERTENSION: ICD-10-CM

## 2023-02-28 DIAGNOSIS — R60.9 PERIPHERAL EDEMA: ICD-10-CM

## 2023-02-28 DIAGNOSIS — Z12.5 SCREENING FOR PROSTATE CANCER: Primary | ICD-10-CM

## 2023-02-28 DIAGNOSIS — L40.9 PSORIASIS: ICD-10-CM

## 2023-02-28 PROCEDURE — 99214 OFFICE O/P EST MOD 30 MIN: CPT | Performed by: NURSE PRACTITIONER

## 2023-02-28 RX ORDER — FLUTICASONE PROPIONATE 0.05 %
CREAM (GRAM) TOPICAL DAILY
Qty: 60 G | Refills: 5 | Status: SHIPPED | OUTPATIENT
Start: 2023-02-28

## 2023-02-28 RX ORDER — NISOLDIPINE 34 MG/1
34 TABLET, FILM COATED, EXTENDED RELEASE ORAL DAILY
Qty: 90 TABLET | Refills: 1 | Status: SHIPPED | OUTPATIENT
Start: 2023-02-28

## 2023-02-28 RX ORDER — CLOBETASOL PROPIONATE 0.5 MG/G
CREAM TOPICAL DAILY
Qty: 60 G | Refills: 5 | Status: SHIPPED | OUTPATIENT
Start: 2023-02-28

## 2023-02-28 RX ORDER — LISINOPRIL AND HYDROCHLOROTHIAZIDE 20; 12.5 MG/1; MG/1
2 TABLET ORAL DAILY
Qty: 180 TABLET | Refills: 1 | Status: SHIPPED | OUTPATIENT
Start: 2023-02-28

## 2023-02-28 RX ORDER — CALCIPOTRIENE 50 UG/G
CREAM TOPICAL
Qty: 120 G | Refills: 5 | Status: SHIPPED | OUTPATIENT
Start: 2023-02-28

## 2023-02-28 RX ORDER — FUROSEMIDE 40 MG/1
40 TABLET ORAL DAILY
Qty: 90 TABLET | Refills: 1 | Status: SHIPPED | OUTPATIENT
Start: 2023-02-28

## 2023-02-28 NOTE — PROGRESS NOTES
"Subjective   Ganesh Viera is a 72 y.o. male.     History of Present Illness    Since the last visit, he has overall felt well.  He has Primary Hypertension and well controlled on current medication, Atrial Fibillation and remains under the care of their cardiologist for management and psoriasis and alternates use of topical steroids for management .  he has been compliant with current medications have reviewed them.  The patient denies medication side effects.  Will refill medications. /80   Pulse 68   Temp 97.5 °F (36.4 °C)   Resp 16   Ht 172 cm (67.72\")   Wt (!) 147 kg (324 lb)   SpO2 94%   BMI 49.67 kg/m²     Results for orders placed or performed in visit on 04/08/21   Comprehensive Metabolic Panel    Specimen: Blood   Result Value Ref Range    Glucose 101 (H) 65 - 99 mg/dL    BUN 20 8 - 23 mg/dL    Creatinine 1.28 (H) 0.76 - 1.27 mg/dL    Sodium 140 136 - 145 mmol/L    Potassium 4.2 3.5 - 5.2 mmol/L    Chloride 100 98 - 107 mmol/L    CO2 27.1 22.0 - 29.0 mmol/L    Calcium 9.4 8.6 - 10.5 mg/dL    Total Protein 7.5 6.0 - 8.5 g/dL    Albumin 4.30 3.50 - 5.20 g/dL    ALT (SGPT) 22 1 - 41 U/L    AST (SGOT) 27 1 - 40 U/L    Alkaline Phosphatase 109 39 - 117 U/L    Total Bilirubin 0.4 0.0 - 1.2 mg/dL    eGFR Non African Amer 56 (L) >60 mL/min/1.73    Globulin 3.2 gm/dL    A/G Ratio 1.3 g/dL    BUN/Creatinine Ratio 15.6 7.0 - 25.0    Anion Gap 12.9 5.0 - 15.0 mmol/L   CBC Auto Differential    Specimen: Blood   Result Value Ref Range    WBC 11.80 (H) 3.40 - 10.80 10*3/mm3    RBC 5.30 4.14 - 5.80 10*6/mm3    Hemoglobin 16.4 13.0 - 17.7 g/dL    Hematocrit 46.5 37.5 - 51.0 %    MCV 87.7 79.0 - 97.0 fL    MCH 30.9 26.6 - 33.0 pg    MCHC 35.3 31.5 - 35.7 g/dL    RDW 13.7 12.3 - 15.4 %    RDW-SD 43.1 37.0 - 54.0 fl    MPV 11.3 6.0 - 12.0 fL    Platelets 247 140 - 450 10*3/mm3    Neutrophil % 74.9 42.7 - 76.0 %    Lymphocyte % 12.7 (L) 19.6 - 45.3 %    Monocyte % 9.4 5.0 - 12.0 %    Eosinophil % 1.7 0.3 - 6.2 % "    Basophil % 0.6 0.0 - 1.5 %    Immature Grans % 0.7 (H) 0.0 - 0.5 %    Neutrophils, Absolute 8.84 (H) 1.70 - 7.00 10*3/mm3    Lymphocytes, Absolute 1.50 0.70 - 3.10 10*3/mm3    Monocytes, Absolute 1.11 (H) 0.10 - 0.90 10*3/mm3    Eosinophils, Absolute 0.20 0.00 - 0.40 10*3/mm3    Basophils, Absolute 0.07 0.00 - 0.20 10*3/mm3    Immature Grans, Absolute 0.08 (H) 0.00 - 0.05 10*3/mm3    nRBC 0.0 0.0 - 0.2 /100 WBC         The following portions of the patient's history were reviewed and updated as appropriate: allergies, current medications, past family history, past medical history, past social history, past surgical history and problem list.    Review of Systems   Constitutional: Negative for fatigue.   Respiratory: Negative for cough and shortness of breath.    Cardiovascular: Negative for chest pain and palpitations.   Endocrine: Negative for cold intolerance, heat intolerance, polydipsia, polyphagia and polyuria.   Skin: Negative for rash.   Psychiatric/Behavioral: Negative for dysphoric mood and sleep disturbance. The patient is not nervous/anxious.        Objective   Physical Exam  Vitals and nursing note reviewed.   Constitutional:       Appearance: He is well-developed.   Neck:      Vascular: No carotid bruit.   Cardiovascular:      Rate and Rhythm: Normal rate and regular rhythm.   Pulmonary:      Effort: Pulmonary effort is normal.      Breath sounds: Normal breath sounds.   Neurological:      Mental Status: He is alert and oriented to person, place, and time.   Psychiatric:         Mood and Affect: Mood normal.         Behavior: Behavior normal.         Thought Content: Thought content normal.         Judgment: Judgment normal.         Assessment & Plan   Diagnoses and all orders for this visit:    1. Screening for prostate cancer (Primary)  -     PSA Screen    2. Benign essential hypertension  -     lisinopril-hydrochlorothiazide (PRINZIDE,ZESTORETIC) 20-12.5 MG per tablet; Take 2 tablets by mouth  Daily.  Dispense: 180 tablet; Refill: 1  -     nisoldipine (SULAR) 34 MG 24 hr tablet; Take 1 tablet by mouth Daily.  Dispense: 90 tablet; Refill: 1  -     Comprehensive metabolic panel  -     Lipid panel  -     CBC and Differential  -     TSH  -     PSA Screen    3. Peripheral edema  -     furosemide (LASIX) 40 MG tablet; Take 1 tablet by mouth Daily.  Dispense: 90 tablet; Refill: 1  -     Comprehensive metabolic panel  -     Lipid panel  -     CBC and Differential  -     TSH  -     PSA Screen    4. Psoriasis  -     fluticasone (CUTIVATE) 0.05 % cream; Apply  topically to the appropriate area as directed Daily.  Dispense: 60 g; Refill: 5  -     calcipotriene (DOVONEX) 0.005 % cream; Apply  topically to the appropriate area as directed Daily.  Dispense: 120 g; Refill: 5  -     clobetasol (TEMOVATE) 0.05 % cream; Apply  topically to the appropriate area as directed Daily.  Dispense: 60 g; Refill: 5

## 2023-03-08 ENCOUNTER — PRIOR AUTHORIZATION (OUTPATIENT)
Dept: FAMILY MEDICINE CLINIC | Facility: CLINIC | Age: 73
End: 2023-03-08
Payer: MEDICARE

## 2023-05-02 RX ORDER — METOPROLOL SUCCINATE 50 MG/1
50 TABLET, EXTENDED RELEASE ORAL DAILY
Qty: 90 TABLET | Refills: 1 | Status: SHIPPED | OUTPATIENT
Start: 2023-05-02

## 2023-08-25 DIAGNOSIS — I10 BENIGN ESSENTIAL HYPERTENSION: ICD-10-CM

## 2023-08-28 DIAGNOSIS — I10 BENIGN ESSENTIAL HYPERTENSION: ICD-10-CM

## 2023-08-28 RX ORDER — LISINOPRIL AND HYDROCHLOROTHIAZIDE 20; 12.5 MG/1; MG/1
2 TABLET ORAL DAILY
Qty: 180 TABLET | OUTPATIENT
Start: 2023-08-28

## 2023-08-28 RX ORDER — NISOLDIPINE 34 MG/1
34 TABLET, FILM COATED, EXTENDED RELEASE ORAL DAILY
Qty: 60 TABLET | Refills: 0 | Status: SHIPPED | OUTPATIENT
Start: 2023-08-28

## 2023-08-28 RX ORDER — LISINOPRIL AND HYDROCHLOROTHIAZIDE 20; 12.5 MG/1; MG/1
2 TABLET ORAL DAILY
Qty: 60 TABLET | Refills: 0 | Status: SHIPPED | OUTPATIENT
Start: 2023-08-28

## 2023-08-28 RX ORDER — NISOLDIPINE 34 MG/1
34 TABLET, FILM COATED, EXTENDED RELEASE ORAL DAILY
Qty: 90 TABLET | OUTPATIENT
Start: 2023-08-28

## 2023-09-14 DIAGNOSIS — R60.9 PERIPHERAL EDEMA: ICD-10-CM

## 2023-09-15 RX ORDER — FUROSEMIDE 40 MG/1
40 TABLET ORAL DAILY
Qty: 90 TABLET | Refills: 1 | Status: SHIPPED | OUTPATIENT
Start: 2023-09-15

## 2023-09-25 DIAGNOSIS — I10 BENIGN ESSENTIAL HYPERTENSION: ICD-10-CM

## 2023-09-25 RX ORDER — APIXABAN 5 MG/1
TABLET, FILM COATED ORAL
Qty: 60 TABLET | Refills: 0 | Status: SHIPPED | OUTPATIENT
Start: 2023-09-25

## 2023-09-25 RX ORDER — LISINOPRIL AND HYDROCHLOROTHIAZIDE 20; 12.5 MG/1; MG/1
2 TABLET ORAL DAILY
Qty: 60 TABLET | Refills: 0 | Status: SHIPPED | OUTPATIENT
Start: 2023-09-25

## 2023-09-25 NOTE — TELEPHONE ENCOUNTER
Last OV 9/1/22 KN  NO future OV  Labs 4/8/21.    Does not meet protocol  Please advise    [facility]

## 2023-09-25 NOTE — TELEPHONE ENCOUNTER
Will place 30 day refill and patient will need to have apptmt with  or Oneida Smith for further refills.

## 2023-10-24 DIAGNOSIS — I10 BENIGN ESSENTIAL HYPERTENSION: ICD-10-CM

## 2023-10-26 RX ORDER — LISINOPRIL AND HYDROCHLOROTHIAZIDE 20; 12.5 MG/1; MG/1
2 TABLET ORAL DAILY
Qty: 180 TABLET | OUTPATIENT
Start: 2023-10-26

## 2023-10-26 RX ORDER — NISOLDIPINE 34 MG/1
34 TABLET, FILM COATED, EXTENDED RELEASE ORAL DAILY
Qty: 90 TABLET | OUTPATIENT
Start: 2023-10-26

## 2023-10-26 NOTE — TELEPHONE ENCOUNTER
Rx Refill Note  Requested Prescriptions     Pending Prescriptions Disp Refills    lisinopril-hydrochlorothiazide (PRINZIDE,ZESTORETIC) 20-12.5 MG per tablet [Pharmacy Med Name: LISINOPRIL-HCTZ 20/12.5MG TABLETS] 180 tablet      Sig: TAKE 2 TABLETS BY MOUTH DAILY    nisoldipine (SULAR) 34 MG 24 hr tablet [Pharmacy Med Name: NISOLDIPINE ER 34MG TABLETS] 90 tablet      Sig: TAKE 1 TABLET BY MOUTH DAILY      Last office visit with prescribing clinician: 2/28/2023   Last telemedicine visit with prescribing clinician: Visit date not found   Next office visit with prescribing clinician: Visit date not found

## 2023-10-30 RX ORDER — METOPROLOL SUCCINATE 50 MG/1
50 TABLET, EXTENDED RELEASE ORAL DAILY
Qty: 30 TABLET | Refills: 0 | OUTPATIENT
Start: 2023-10-30

## 2023-11-30 DIAGNOSIS — I10 BENIGN ESSENTIAL HYPERTENSION: ICD-10-CM

## 2023-11-30 RX ORDER — NISOLDIPINE 34 MG/1
34 TABLET, FILM COATED, EXTENDED RELEASE ORAL DAILY
Qty: 60 TABLET | Refills: 0 | Status: SHIPPED | OUTPATIENT
Start: 2023-11-30

## 2023-11-30 RX ORDER — LISINOPRIL AND HYDROCHLOROTHIAZIDE 20; 12.5 MG/1; MG/1
2 TABLET ORAL DAILY
Qty: 60 TABLET | Refills: 0 | Status: SHIPPED | OUTPATIENT
Start: 2023-11-30

## 2023-12-15 RX ORDER — NISOLDIPINE 34 MG/1
34 TABLET, FILM COATED, EXTENDED RELEASE ORAL DAILY
Qty: 90 TABLET | OUTPATIENT
Start: 2023-12-15

## 2023-12-15 RX ORDER — LISINOPRIL AND HYDROCHLOROTHIAZIDE 20; 12.5 MG/1; MG/1
2 TABLET ORAL DAILY
Qty: 180 TABLET | OUTPATIENT
Start: 2023-12-15

## 2024-01-11 DIAGNOSIS — I10 BENIGN ESSENTIAL HYPERTENSION: ICD-10-CM

## 2024-01-11 RX ORDER — LISINOPRIL AND HYDROCHLOROTHIAZIDE 20; 12.5 MG/1; MG/1
2 TABLET ORAL DAILY
Qty: 180 TABLET | OUTPATIENT
Start: 2024-01-11

## 2024-04-01 DIAGNOSIS — R60.0 PERIPHERAL EDEMA: ICD-10-CM

## 2024-04-01 RX ORDER — FUROSEMIDE 40 MG/1
40 TABLET ORAL DAILY
Qty: 14 TABLET | Refills: 0 | Status: SHIPPED | OUTPATIENT
Start: 2024-04-01

## 2024-04-01 NOTE — TELEPHONE ENCOUNTER
02/28/2023 MASTER ALEXANDRE    Return in about 6 months (around 8/28/2023) for Next scheduled follow up.

## 2024-04-15 RX ORDER — METOPROLOL SUCCINATE 50 MG/1
50 TABLET, EXTENDED RELEASE ORAL DAILY
Qty: 30 TABLET | Refills: 0 | Status: SHIPPED | OUTPATIENT
Start: 2024-04-15 | End: 2024-04-16

## 2024-04-16 RX ORDER — METOPROLOL SUCCINATE 50 MG/1
50 TABLET, EXTENDED RELEASE ORAL DAILY
Qty: 90 TABLET | OUTPATIENT
Start: 2024-04-16

## 2024-04-16 RX ORDER — METOPROLOL SUCCINATE 50 MG/1
50 TABLET, EXTENDED RELEASE ORAL DAILY
Qty: 30 TABLET | Refills: 0 | Status: SHIPPED | OUTPATIENT
Start: 2024-04-16

## 2024-08-30 RX ORDER — APIXABAN 5 MG/1
5 TABLET, FILM COATED ORAL EVERY 12 HOURS
Qty: 60 TABLET | Refills: 0 | OUTPATIENT
Start: 2024-08-30